# Patient Record
Sex: FEMALE | URBAN - METROPOLITAN AREA
[De-identification: names, ages, dates, MRNs, and addresses within clinical notes are randomized per-mention and may not be internally consistent; named-entity substitution may affect disease eponyms.]

---

## 2019-07-25 ENCOUNTER — TELEPHONE (OUTPATIENT)
Dept: INFECTIOUS DISEASES | Facility: CLINIC | Age: 39
End: 2019-07-25

## 2019-07-25 NOTE — TELEPHONE ENCOUNTER
Pt called to establish care with our practice for HIV  She currently sees a provider in [place] but it is too far for her to travel  Pt will have her records and labs forwarded to our office  We can then order labs and schedule pt for new pt appt  Gave pt fax # to have records sent

## 2022-10-28 ENCOUNTER — APPOINTMENT (OUTPATIENT)
Dept: RADIOLOGY | Facility: MEDICAL CENTER | Age: 42
End: 2022-10-28
Payer: COMMERCIAL

## 2022-10-28 ENCOUNTER — OFFICE VISIT (OUTPATIENT)
Dept: URGENT CARE | Facility: MEDICAL CENTER | Age: 42
End: 2022-10-28
Payer: COMMERCIAL

## 2022-10-28 VITALS
RESPIRATION RATE: 18 BRPM | HEIGHT: 66 IN | DIASTOLIC BLOOD PRESSURE: 90 MMHG | WEIGHT: 199 LBS | SYSTOLIC BLOOD PRESSURE: 150 MMHG | OXYGEN SATURATION: 100 % | HEART RATE: 83 BPM | BODY MASS INDEX: 31.98 KG/M2 | TEMPERATURE: 98.5 F

## 2022-10-28 DIAGNOSIS — R07.81 RIB PAIN: ICD-10-CM

## 2022-10-28 DIAGNOSIS — F41.0 PANIC ATTACK: Primary | ICD-10-CM

## 2022-10-28 PROCEDURE — 99203 OFFICE O/P NEW LOW 30 MIN: CPT | Performed by: PHYSICIAN ASSISTANT

## 2022-10-28 PROCEDURE — 71101 X-RAY EXAM UNILAT RIBS/CHEST: CPT

## 2022-10-28 RX ORDER — HYDROXYZINE PAMOATE 100 MG/1
100 CAPSULE ORAL 3 TIMES DAILY PRN
Qty: 30 CAPSULE | Refills: 0 | Status: SHIPPED | OUTPATIENT
Start: 2022-10-28

## 2022-10-28 RX ORDER — BICTEGRAVIR SODIUM, EMTRICITABINE, AND TENOFOVIR ALAFENAMIDE FUMARATE 50; 200; 25 MG/1; MG/1; MG/1
1 TABLET ORAL DAILY
COMMUNITY
Start: 2022-10-04

## 2022-10-28 RX ORDER — NAPROXEN 500 MG/1
500 TABLET ORAL 2 TIMES DAILY PRN
COMMUNITY
Start: 2022-07-21
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
## 2022-10-28 NOTE — LETTER
October 28, 2022     Patient: Delliah Gallardo   YOB: 1980   Date of Visit: 10/28/2022       To Whom it May Concern:    Delilah Gallardo was seen in my clinic on 10/28/2022  She is excused from work 10/28/2022    If you have any questions or concerns, please don't hesitate to call           Sincerely,          Brittaney Adan PA-C        CC: No Recipients

## 2023-08-17 ENCOUNTER — OCCMED (OUTPATIENT)
Dept: URGENT CARE | Facility: CLINIC | Age: 43
End: 2023-08-17
Payer: OTHER MISCELLANEOUS

## 2023-08-17 ENCOUNTER — APPOINTMENT (OUTPATIENT)
Dept: RADIOLOGY | Facility: CLINIC | Age: 43
End: 2023-08-17
Payer: OTHER MISCELLANEOUS

## 2023-08-17 DIAGNOSIS — M25.562 ACUTE PAIN OF LEFT KNEE: ICD-10-CM

## 2023-08-17 DIAGNOSIS — M79.642 LEFT HAND PAIN: ICD-10-CM

## 2023-08-17 DIAGNOSIS — M54.50 LUMBAR BACK PAIN: ICD-10-CM

## 2023-08-17 DIAGNOSIS — S60.212A CONTUSION OF LEFT WRIST, INITIAL ENCOUNTER: ICD-10-CM

## 2023-08-17 DIAGNOSIS — S39.012A STRAIN OF LUMBAR REGION, INITIAL ENCOUNTER: ICD-10-CM

## 2023-08-17 DIAGNOSIS — S80.02XA CONTUSION OF LEFT KNEE, INITIAL ENCOUNTER: ICD-10-CM
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
## 2023-08-17 DIAGNOSIS — S60.222A CONTUSION OF LEFT HAND, INITIAL ENCOUNTER: Primary | ICD-10-CM

## 2023-08-17 DIAGNOSIS — M25.532 LEFT WRIST PAIN: ICD-10-CM

## 2023-08-17 PROCEDURE — 73564 X-RAY EXAM KNEE 4 OR MORE: CPT

## 2023-08-17 PROCEDURE — G0382 LEV 3 HOSP TYPE B ED VISIT: HCPCS

## 2023-08-17 PROCEDURE — 99283 EMERGENCY DEPT VISIT LOW MDM: CPT

## 2023-08-17 PROCEDURE — 73130 X-RAY EXAM OF HAND: CPT

## 2023-08-17 PROCEDURE — 72100 X-RAY EXAM L-S SPINE 2/3 VWS: CPT

## 2023-08-17 PROCEDURE — 73110 X-RAY EXAM OF WRIST: CPT

## 2023-08-22 ENCOUNTER — OFFICE VISIT (OUTPATIENT)
Dept: URGENT CARE | Facility: CLINIC | Age: 43
End: 2023-08-22
Payer: OTHER MISCELLANEOUS

## 2023-08-22 DIAGNOSIS — S60.212D CONTUSION OF LEFT WRIST, SUBSEQUENT ENCOUNTER: ICD-10-CM

## 2023-08-22 DIAGNOSIS — S39.012D STRAIN OF MUSCLE, FASCIA AND TENDON OF LOWER BACK, SUBSEQUENT ENCOUNTER: ICD-10-CM

## 2023-08-22 DIAGNOSIS — S60.222D CONTUSION OF LEFT HAND, SUBSEQUENT ENCOUNTER: Primary | ICD-10-CM

## 2023-08-22 DIAGNOSIS — S80.02XD CONTUSION OF LEFT KNEE, SUBSEQUENT ENCOUNTER: ICD-10-CM
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
## 2023-08-22 PROCEDURE — 99213 OFFICE O/P EST LOW 20 MIN: CPT

## 2023-08-22 NOTE — PROGRESS NOTES
Acknowledgement of Current Treatment Plan     I have reviewed my treatment plan with my therapist / counselor on 6/21. I agree with the plan as it is written in the electronic health record, and I have had input into the goals and strategies.       Client Name:   Loan Calhoun   Signature:  _______________________________  Date:  ________ Time: __________     Name of Therapist or Counselor:  Terence LESTER                Date: June 21, 2023   Time: 7:32 AM          This encounter was created for [facility] orders only .

## 2023-08-30 ENCOUNTER — OCCMED (OUTPATIENT)
Dept: URGENT CARE | Facility: CLINIC | Age: 43
End: 2023-08-30

## 2023-08-30 DIAGNOSIS — S80.02XD CONTUSION OF LEFT KNEE, SUBSEQUENT ENCOUNTER: ICD-10-CM

## 2023-08-30 DIAGNOSIS — S39.012D STRAIN OF MUSCLE, FASCIA AND TENDON OF LOWER BACK, SUBSEQUENT ENCOUNTER: ICD-10-CM

## 2023-08-30 DIAGNOSIS — S60.222D CONTUSION OF LEFT HAND, SUBSEQUENT ENCOUNTER: Primary | ICD-10-CM

## 2023-08-30 DIAGNOSIS — S60.212D CONTUSION OF LEFT WRIST, SUBSEQUENT ENCOUNTER: ICD-10-CM
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
## 2023-09-14 ENCOUNTER — OCCMED (OUTPATIENT)
Dept: URGENT CARE | Facility: CLINIC | Age: 43
End: 2023-09-14
Payer: OTHER MISCELLANEOUS

## 2023-09-14 DIAGNOSIS — S80.02XD CONTUSION OF LEFT KNEE, SUBSEQUENT ENCOUNTER: ICD-10-CM

## 2023-09-14 DIAGNOSIS — S60.212D CONTUSION OF LEFT WRIST, SUBSEQUENT ENCOUNTER: ICD-10-CM

## 2023-09-14 DIAGNOSIS — S60.222D CONTUSION OF LEFT HAND, SUBSEQUENT ENCOUNTER: Primary | ICD-10-CM
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
## 2023-09-14 DIAGNOSIS — S39.012D STRAIN OF MUSCLE, FASCIA AND TENDON OF LOWER BACK, SUBSEQUENT ENCOUNTER: ICD-10-CM

## 2023-09-14 PROCEDURE — 99213 OFFICE O/P EST LOW 20 MIN: CPT

## 2023-11-10 ENCOUNTER — OCCMED (OUTPATIENT)
Dept: URGENT CARE | Facility: CLINIC | Age: 43
End: 2023-11-10
Payer: OTHER MISCELLANEOUS

## 2023-11-10 ENCOUNTER — APPOINTMENT (OUTPATIENT)
Dept: RADIOLOGY | Facility: CLINIC | Age: 43
End: 2023-11-10
Payer: OTHER MISCELLANEOUS
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
## 2023-11-10 DIAGNOSIS — M79.661 PAIN IN RIGHT LOWER LEG: ICD-10-CM

## 2023-11-10 DIAGNOSIS — M54.41 ACUTE MIDLINE LOW BACK PAIN WITH RIGHT-SIDED SCIATICA: ICD-10-CM

## 2023-11-10 DIAGNOSIS — S00.03XA CONTUSION OF SCALP, INITIAL ENCOUNTER: ICD-10-CM

## 2023-11-10 PROCEDURE — 72100 X-RAY EXAM L-S SPINE 2/3 VWS: CPT

## 2023-11-10 PROCEDURE — 99284 EMERGENCY DEPT VISIT MOD MDM: CPT

## 2023-11-10 PROCEDURE — 73000 X-RAY EXAM OF COLLAR BONE: CPT

## 2023-11-10 PROCEDURE — 73590 X-RAY EXAM OF LOWER LEG: CPT

## 2023-11-10 PROCEDURE — G0383 LEV 4 HOSP TYPE B ED VISIT: HCPCS

## 2023-11-16 ENCOUNTER — APPOINTMENT (OUTPATIENT)
Dept: URGENT CARE | Facility: CLINIC | Age: 43
End: 2023-11-16
Payer: OTHER MISCELLANEOUS

## 2023-11-16 PROCEDURE — 99213 OFFICE O/P EST LOW 20 MIN: CPT

## 2023-11-30 ENCOUNTER — APPOINTMENT (OUTPATIENT)
Dept: URGENT CARE | Facility: CLINIC | Age: 43
End: 2023-11-30
Payer: OTHER MISCELLANEOUS

## 2023-11-30 PROCEDURE — 99213 OFFICE O/P EST LOW 20 MIN: CPT

## 2023-12-12 ENCOUNTER — APPOINTMENT (OUTPATIENT)
Dept: URGENT CARE | Facility: CLINIC | Age: 43
End: 2023-12-12
Payer: OTHER MISCELLANEOUS

## 2023-12-12 PROCEDURE — 99213 OFFICE O/P EST LOW 20 MIN: CPT

## 2023-12-27 ENCOUNTER — APPOINTMENT (OUTPATIENT)
Dept: URGENT CARE | Facility: MEDICAL CENTER | Age: 43
End: 2023-12-27
Payer: OTHER MISCELLANEOUS

## 2023-12-27 PROCEDURE — 99214 OFFICE O/P EST MOD 30 MIN: CPT

## 2024-01-04 ENCOUNTER — HOSPITAL ENCOUNTER (OUTPATIENT)
Dept: MRI IMAGING | Facility: HOSPITAL | Age: 44
Discharge: HOME | End: 2024-01-04
Attending: FAMILY MEDICINE
Payer: OTHER MISCELLANEOUS

## 2024-01-04 DIAGNOSIS — M54.2 CERVICALGIA: ICD-10-CM

## 2024-01-04 DIAGNOSIS — M54.16 LUMBAR RADICULOPATHY: ICD-10-CM

## 2024-01-04 PROCEDURE — 72141 MRI NECK SPINE W/O DYE: CPT

## 2024-01-04 PROCEDURE — 72148 MRI LUMBAR SPINE W/O DYE: CPT

## 2024-01-04 PROCEDURE — G1004 CDSM NDSC: HCPCS

## 2024-01-10 ENCOUNTER — APPOINTMENT (OUTPATIENT)
Dept: URGENT CARE | Facility: CLINIC | Age: 44
End: 2024-01-10
Payer: OTHER MISCELLANEOUS

## 2024-01-10 PROCEDURE — 99213 OFFICE O/P EST LOW 20 MIN: CPT

## 2024-01-16 ENCOUNTER — EVALUATION (OUTPATIENT)
Dept: PHYSICAL THERAPY | Facility: CLINIC | Age: 44
End: 2024-01-16
Payer: OTHER MISCELLANEOUS

## 2024-01-16 DIAGNOSIS — S40.012D CONTUSION OF LEFT SCAPULA, SUBSEQUENT ENCOUNTER: Primary | ICD-10-CM

## 2024-01-16 PROCEDURE — 97140 MANUAL THERAPY 1/> REGIONS: CPT | Performed by: PHYSICAL THERAPIST

## 2024-01-16 PROCEDURE — 97161 PT EVAL LOW COMPLEX 20 MIN: CPT | Performed by: PHYSICAL THERAPIST

## 2024-01-16 PROCEDURE — 97110 THERAPEUTIC EXERCISES: CPT | Performed by: PHYSICAL THERAPIST
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
## 2024-01-18 ENCOUNTER — OFFICE VISIT (OUTPATIENT)
Dept: PHYSICAL THERAPY | Facility: CLINIC | Age: 44
End: 2024-01-18
Payer: OTHER MISCELLANEOUS

## 2024-01-18 DIAGNOSIS — S40.012D CONTUSION OF LEFT SCAPULA, SUBSEQUENT ENCOUNTER: Primary | ICD-10-CM

## 2024-01-18 PROCEDURE — 97140 MANUAL THERAPY 1/> REGIONS: CPT

## 2024-01-18 PROCEDURE — 97110 THERAPEUTIC EXERCISES: CPT
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
## 2024-01-23 ENCOUNTER — OFFICE VISIT (OUTPATIENT)
Dept: PHYSICAL THERAPY | Facility: CLINIC | Age: 44
End: 2024-01-23
Payer: OTHER MISCELLANEOUS

## 2024-01-23 DIAGNOSIS — S40.012D CONTUSION OF LEFT SCAPULA, SUBSEQUENT ENCOUNTER: Primary | ICD-10-CM

## 2024-01-23 PROCEDURE — 97110 THERAPEUTIC EXERCISES: CPT

## 2024-01-23 PROCEDURE — 97140 MANUAL THERAPY 1/> REGIONS: CPT
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
## 2024-01-25 ENCOUNTER — OFFICE VISIT (OUTPATIENT)
Dept: PHYSICAL THERAPY | Facility: CLINIC | Age: 44
End: 2024-01-25
Payer: OTHER MISCELLANEOUS

## 2024-01-25 DIAGNOSIS — S40.012D CONTUSION OF LEFT SCAPULA, SUBSEQUENT ENCOUNTER: Primary | ICD-10-CM

## 2024-01-25 PROCEDURE — 97140 MANUAL THERAPY 1/> REGIONS: CPT

## 2024-01-25 PROCEDURE — 97110 THERAPEUTIC EXERCISES: CPT

## 2024-01-25 PROCEDURE — 97112 NEUROMUSCULAR REEDUCATION: CPT
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
## 2024-01-30 ENCOUNTER — OFFICE VISIT (OUTPATIENT)
Dept: PHYSICAL THERAPY | Facility: CLINIC | Age: 44
End: 2024-01-30
Payer: OTHER MISCELLANEOUS

## 2024-01-30 DIAGNOSIS — S40.012D CONTUSION OF LEFT SCAPULA, SUBSEQUENT ENCOUNTER: Primary | ICD-10-CM

## 2024-01-30 PROCEDURE — 97110 THERAPEUTIC EXERCISES: CPT | Performed by: PHYSICAL THERAPIST

## 2024-01-30 PROCEDURE — 97140 MANUAL THERAPY 1/> REGIONS: CPT | Performed by: PHYSICAL THERAPIST

## 2024-01-30 PROCEDURE — 97112 NEUROMUSCULAR REEDUCATION: CPT | Performed by: PHYSICAL THERAPIST
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
## 2024-01-31 ENCOUNTER — APPOINTMENT (OUTPATIENT)
Dept: URGENT CARE | Facility: MEDICAL CENTER | Age: 44
End: 2024-01-31
Payer: OTHER MISCELLANEOUS

## 2024-01-31 PROCEDURE — 99213 OFFICE O/P EST LOW 20 MIN: CPT | Performed by: FAMILY MEDICINE

## 2024-02-01 ENCOUNTER — OFFICE VISIT (OUTPATIENT)
Dept: PHYSICAL THERAPY | Facility: CLINIC | Age: 44
End: 2024-02-01
Payer: OTHER MISCELLANEOUS

## 2024-02-01 DIAGNOSIS — S40.012D CONTUSION OF LEFT SCAPULA, SUBSEQUENT ENCOUNTER: Primary | ICD-10-CM

## 2024-02-01 PROCEDURE — 97112 NEUROMUSCULAR REEDUCATION: CPT

## 2024-02-01 PROCEDURE — 97110 THERAPEUTIC EXERCISES: CPT

## 2024-02-01 PROCEDURE — 97140 MANUAL THERAPY 1/> REGIONS: CPT
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
## 2024-02-06 ENCOUNTER — OFFICE VISIT (OUTPATIENT)
Dept: PHYSICAL THERAPY | Facility: CLINIC | Age: 44
End: 2024-02-06
Payer: OTHER MISCELLANEOUS

## 2024-02-06 DIAGNOSIS — S40.012D CONTUSION OF LEFT SCAPULA, SUBSEQUENT ENCOUNTER: Primary | ICD-10-CM

## 2024-02-06 PROCEDURE — 97110 THERAPEUTIC EXERCISES: CPT
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
## 2024-02-08 ENCOUNTER — EVALUATION (OUTPATIENT)
Dept: PHYSICAL THERAPY | Facility: CLINIC | Age: 44
End: 2024-02-08
Payer: OTHER MISCELLANEOUS

## 2024-02-08 DIAGNOSIS — S40.012D CONTUSION OF LEFT SCAPULA, SUBSEQUENT ENCOUNTER: Primary | ICD-10-CM

## 2024-02-08 PROCEDURE — 97140 MANUAL THERAPY 1/> REGIONS: CPT

## 2024-02-08 PROCEDURE — 97110 THERAPEUTIC EXERCISES: CPT

## 2024-02-08 PROCEDURE — 97112 NEUROMUSCULAR REEDUCATION: CPT
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
## 2024-02-13 ENCOUNTER — APPOINTMENT (OUTPATIENT)
Dept: PHYSICAL THERAPY | Facility: CLINIC | Age: 44
End: 2024-02-13
Payer: OTHER MISCELLANEOUS

## 2024-02-14 ENCOUNTER — OFFICE VISIT (OUTPATIENT)
Dept: PHYSICAL THERAPY | Facility: CLINIC | Age: 44
End: 2024-02-14
Payer: OTHER MISCELLANEOUS

## 2024-02-14 DIAGNOSIS — S40.012D CONTUSION OF LEFT SCAPULA, SUBSEQUENT ENCOUNTER: Primary | ICD-10-CM

## 2024-02-14 PROCEDURE — 97112 NEUROMUSCULAR REEDUCATION: CPT

## 2024-02-14 PROCEDURE — 97110 THERAPEUTIC EXERCISES: CPT
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
## 2024-02-15 ENCOUNTER — OFFICE VISIT (OUTPATIENT)
Dept: PHYSICAL THERAPY | Facility: CLINIC | Age: 44
End: 2024-02-15
Payer: OTHER MISCELLANEOUS

## 2024-02-15 DIAGNOSIS — S40.012D CONTUSION OF LEFT SCAPULA, SUBSEQUENT ENCOUNTER: Primary | ICD-10-CM

## 2024-02-15 PROCEDURE — 97112 NEUROMUSCULAR REEDUCATION: CPT | Performed by: PHYSICAL THERAPIST

## 2024-02-15 PROCEDURE — 97110 THERAPEUTIC EXERCISES: CPT | Performed by: PHYSICAL THERAPIST

## 2024-02-15 PROCEDURE — 97140 MANUAL THERAPY 1/> REGIONS: CPT | Performed by: PHYSICAL THERAPIST
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
## 2024-02-20 ENCOUNTER — OFFICE VISIT (OUTPATIENT)
Dept: PHYSICAL THERAPY | Facility: CLINIC | Age: 44
End: 2024-02-20
Payer: OTHER MISCELLANEOUS

## 2024-02-20 DIAGNOSIS — S40.012D CONTUSION OF LEFT SCAPULA, SUBSEQUENT ENCOUNTER: Primary | ICD-10-CM

## 2024-02-20 PROCEDURE — 97112 NEUROMUSCULAR REEDUCATION: CPT

## 2024-02-20 PROCEDURE — 97110 THERAPEUTIC EXERCISES: CPT

## 2024-02-20 PROCEDURE — 97140 MANUAL THERAPY 1/> REGIONS: CPT
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
## 2024-02-22 ENCOUNTER — OFFICE VISIT (OUTPATIENT)
Dept: PHYSICAL THERAPY | Facility: CLINIC | Age: 44
End: 2024-02-22
Payer: OTHER MISCELLANEOUS

## 2024-02-22 DIAGNOSIS — S40.012D CONTUSION OF LEFT SCAPULA, SUBSEQUENT ENCOUNTER: Primary | ICD-10-CM

## 2024-02-22 PROCEDURE — 97110 THERAPEUTIC EXERCISES: CPT

## 2024-02-22 PROCEDURE — 97112 NEUROMUSCULAR REEDUCATION: CPT

## 2024-02-22 PROCEDURE — 97530 THERAPEUTIC ACTIVITIES: CPT
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
## 2024-02-26 ENCOUNTER — OFFICE VISIT (OUTPATIENT)
Dept: PHYSICAL THERAPY | Facility: CLINIC | Age: 44
End: 2024-02-26
Payer: OTHER MISCELLANEOUS

## 2024-02-26 DIAGNOSIS — S40.012D CONTUSION OF LEFT SCAPULA, SUBSEQUENT ENCOUNTER: Primary | ICD-10-CM

## 2024-02-26 PROCEDURE — 97112 NEUROMUSCULAR REEDUCATION: CPT

## 2024-02-26 PROCEDURE — 97110 THERAPEUTIC EXERCISES: CPT
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
## 2024-02-28 ENCOUNTER — APPOINTMENT (OUTPATIENT)
Dept: URGENT CARE | Facility: MEDICAL CENTER | Age: 44
End: 2024-02-28
Payer: OTHER MISCELLANEOUS

## 2024-02-28 PROCEDURE — 99214 OFFICE O/P EST MOD 30 MIN: CPT | Performed by: FAMILY MEDICINE

## 2024-02-29 ENCOUNTER — OFFICE VISIT (OUTPATIENT)
Dept: PHYSICAL THERAPY | Facility: CLINIC | Age: 44
End: 2024-02-29
Payer: OTHER MISCELLANEOUS

## 2024-02-29 DIAGNOSIS — S40.012D CONTUSION OF LEFT SCAPULA, SUBSEQUENT ENCOUNTER: Primary | ICD-10-CM

## 2024-02-29 PROCEDURE — 97140 MANUAL THERAPY 1/> REGIONS: CPT

## 2024-02-29 PROCEDURE — 97530 THERAPEUTIC ACTIVITIES: CPT

## 2024-02-29 PROCEDURE — 97112 NEUROMUSCULAR REEDUCATION: CPT

## 2024-02-29 PROCEDURE — 97110 THERAPEUTIC EXERCISES: CPT
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
## 2024-03-05 ENCOUNTER — OFFICE VISIT (OUTPATIENT)
Dept: PHYSICAL THERAPY | Facility: CLINIC | Age: 44
End: 2024-03-05
Payer: OTHER MISCELLANEOUS

## 2024-03-05 DIAGNOSIS — S40.012D CONTUSION OF LEFT SCAPULA, SUBSEQUENT ENCOUNTER: Primary | ICD-10-CM

## 2024-03-05 PROCEDURE — 97110 THERAPEUTIC EXERCISES: CPT
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
## 2024-03-07 ENCOUNTER — OFFICE VISIT (OUTPATIENT)
Dept: PHYSICAL THERAPY | Facility: CLINIC | Age: 44
End: 2024-03-07
Payer: OTHER MISCELLANEOUS

## 2024-03-07 DIAGNOSIS — S40.012D CONTUSION OF LEFT SCAPULA, SUBSEQUENT ENCOUNTER: Primary | ICD-10-CM

## 2024-03-07 PROCEDURE — 97110 THERAPEUTIC EXERCISES: CPT

## 2024-03-07 PROCEDURE — 97530 THERAPEUTIC ACTIVITIES: CPT

## 2024-03-07 PROCEDURE — 97112 NEUROMUSCULAR REEDUCATION: CPT

## 2024-03-07 PROCEDURE — 97116 GAIT TRAINING THERAPY: CPT
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
## 2024-03-12 ENCOUNTER — EVALUATION (OUTPATIENT)
Dept: PHYSICAL THERAPY | Facility: CLINIC | Age: 44
End: 2024-03-12
Payer: OTHER MISCELLANEOUS

## 2024-03-12 DIAGNOSIS — S40.012D CONTUSION OF LEFT SCAPULA, SUBSEQUENT ENCOUNTER: Primary | ICD-10-CM

## 2024-03-12 PROCEDURE — 97110 THERAPEUTIC EXERCISES: CPT | Performed by: PHYSICAL THERAPIST

## 2024-03-12 PROCEDURE — 97112 NEUROMUSCULAR REEDUCATION: CPT | Performed by: PHYSICAL THERAPIST

## 2024-03-12 PROCEDURE — 97530 THERAPEUTIC ACTIVITIES: CPT | Performed by: PHYSICAL THERAPIST
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
## 2024-03-14 ENCOUNTER — OFFICE VISIT (OUTPATIENT)
Dept: PHYSICAL THERAPY | Facility: CLINIC | Age: 44
End: 2024-03-14
Payer: OTHER MISCELLANEOUS

## 2024-03-14 DIAGNOSIS — S40.012D CONTUSION OF LEFT SCAPULA, SUBSEQUENT ENCOUNTER: Primary | ICD-10-CM

## 2024-03-14 PROCEDURE — 97530 THERAPEUTIC ACTIVITIES: CPT

## 2024-03-14 PROCEDURE — 97110 THERAPEUTIC EXERCISES: CPT
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
## 2024-03-19 ENCOUNTER — OFFICE VISIT (OUTPATIENT)
Dept: PHYSICAL THERAPY | Facility: CLINIC | Age: 44
End: 2024-03-19
Payer: OTHER MISCELLANEOUS

## 2024-03-19 DIAGNOSIS — S40.012D CONTUSION OF LEFT SCAPULA, SUBSEQUENT ENCOUNTER: Primary | ICD-10-CM

## 2024-03-19 PROCEDURE — 97530 THERAPEUTIC ACTIVITIES: CPT

## 2024-03-19 PROCEDURE — 97112 NEUROMUSCULAR REEDUCATION: CPT

## 2024-03-19 PROCEDURE — 97110 THERAPEUTIC EXERCISES: CPT
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
## 2024-03-21 ENCOUNTER — OFFICE VISIT (OUTPATIENT)
Dept: PHYSICAL THERAPY | Facility: CLINIC | Age: 44
End: 2024-03-21
Payer: OTHER MISCELLANEOUS

## 2024-03-21 DIAGNOSIS — S40.012D CONTUSION OF LEFT SCAPULA, SUBSEQUENT ENCOUNTER: Primary | ICD-10-CM

## 2024-03-21 PROCEDURE — 97112 NEUROMUSCULAR REEDUCATION: CPT

## 2024-03-21 PROCEDURE — 97110 THERAPEUTIC EXERCISES: CPT
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
## 2024-03-26 ENCOUNTER — OFFICE VISIT (OUTPATIENT)
Dept: PHYSICAL THERAPY | Facility: CLINIC | Age: 44
End: 2024-03-26
Payer: OTHER MISCELLANEOUS

## 2024-03-26 DIAGNOSIS — S40.012D CONTUSION OF LEFT SCAPULA, SUBSEQUENT ENCOUNTER: Primary | ICD-10-CM

## 2024-03-26 PROCEDURE — 97110 THERAPEUTIC EXERCISES: CPT

## 2024-03-26 PROCEDURE — 97112 NEUROMUSCULAR REEDUCATION: CPT
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
## 2024-03-27 ENCOUNTER — APPOINTMENT (OUTPATIENT)
Dept: URGENT CARE | Facility: MEDICAL CENTER | Age: 44
End: 2024-03-27
Payer: OTHER MISCELLANEOUS

## 2024-03-27 ENCOUNTER — OFFICE VISIT (OUTPATIENT)
Dept: PHYSICAL THERAPY | Facility: CLINIC | Age: 44
End: 2024-03-27
Payer: OTHER MISCELLANEOUS

## 2024-03-27 DIAGNOSIS — S40.012D CONTUSION OF LEFT SCAPULA, SUBSEQUENT ENCOUNTER: Primary | ICD-10-CM

## 2024-03-27 PROCEDURE — 99213 OFFICE O/P EST LOW 20 MIN: CPT

## 2024-03-27 PROCEDURE — 97530 THERAPEUTIC ACTIVITIES: CPT

## 2024-03-27 PROCEDURE — 97110 THERAPEUTIC EXERCISES: CPT

## 2024-03-27 PROCEDURE — 97112 NEUROMUSCULAR REEDUCATION: CPT
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
## 2024-03-28 ENCOUNTER — APPOINTMENT (OUTPATIENT)
Dept: PHYSICAL THERAPY | Facility: CLINIC | Age: 44
End: 2024-03-28
Payer: OTHER MISCELLANEOUS

## 2024-04-01 ENCOUNTER — EVALUATION (OUTPATIENT)
Dept: PHYSICAL THERAPY | Facility: CLINIC | Age: 44
End: 2024-04-01
Payer: OTHER MISCELLANEOUS

## 2024-04-01 DIAGNOSIS — S40.012D CONTUSION OF LEFT SCAPULA, SUBSEQUENT ENCOUNTER: Primary | ICD-10-CM

## 2024-04-01 PROCEDURE — 97530 THERAPEUTIC ACTIVITIES: CPT | Performed by: PHYSICAL THERAPIST

## 2024-04-01 PROCEDURE — 97110 THERAPEUTIC EXERCISES: CPT | Performed by: PHYSICAL THERAPIST

## 2024-04-01 PROCEDURE — 97112 NEUROMUSCULAR REEDUCATION: CPT | Performed by: PHYSICAL THERAPIST
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
## 2024-04-03 ENCOUNTER — OFFICE VISIT (OUTPATIENT)
Dept: PHYSICAL THERAPY | Facility: CLINIC | Age: 44
End: 2024-04-03
Payer: OTHER MISCELLANEOUS

## 2024-04-03 DIAGNOSIS — S40.012D CONTUSION OF LEFT SCAPULA, SUBSEQUENT ENCOUNTER: Primary | ICD-10-CM

## 2024-04-03 PROCEDURE — 97112 NEUROMUSCULAR REEDUCATION: CPT

## 2024-04-03 PROCEDURE — 97110 THERAPEUTIC EXERCISES: CPT
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
## 2024-04-08 ENCOUNTER — APPOINTMENT (OUTPATIENT)
Dept: RADIOLOGY | Facility: CLINIC | Age: 44
End: 2024-04-08
Payer: COMMERCIAL

## 2024-04-08 ENCOUNTER — OFFICE VISIT (OUTPATIENT)
Dept: URGENT CARE | Facility: CLINIC | Age: 44
End: 2024-04-08
Payer: COMMERCIAL

## 2024-04-08 VITALS
RESPIRATION RATE: 20 BRPM | HEART RATE: 94 BPM | TEMPERATURE: 98.7 F | DIASTOLIC BLOOD PRESSURE: 84 MMHG | OXYGEN SATURATION: 96 % | SYSTOLIC BLOOD PRESSURE: 150 MMHG

## 2024-04-08 DIAGNOSIS — R06.2 WHEEZING: ICD-10-CM

## 2024-04-08 DIAGNOSIS — R06.2 WHEEZING: Primary | ICD-10-CM

## 2024-04-08 DIAGNOSIS — J01.11 ACUTE RECURRENT FRONTAL SINUSITIS: ICD-10-CM

## 2024-04-08 PROCEDURE — 94640 AIRWAY INHALATION TREATMENT: CPT | Performed by: PHYSICIAN ASSISTANT

## 2024-04-08 PROCEDURE — G0382 LEV 3 HOSP TYPE B ED VISIT: HCPCS | Performed by: PHYSICIAN ASSISTANT

## 2024-04-08 PROCEDURE — 71046 X-RAY EXAM CHEST 2 VIEWS: CPT

## 2024-04-08 RX ORDER — IPRATROPIUM BROMIDE AND ALBUTEROL SULFATE 2.5; .5 MG/3ML; MG/3ML
3 SOLUTION RESPIRATORY (INHALATION) ONCE
Status: COMPLETED | OUTPATIENT
Start: 2024-04-08 | End: 2024-04-08

## 2024-04-08 RX ORDER — PREDNISONE 10 MG/1
TABLET ORAL
Qty: 20 TABLET | Refills: 0 | Status: SHIPPED | OUTPATIENT
Start: 2024-04-08

## 2024-04-08 RX ORDER — DOXYCYCLINE HYCLATE 100 MG
100 TABLET ORAL 2 TIMES DAILY
Qty: 14 TABLET | Refills: 0 | Status: SHIPPED | OUTPATIENT
Start: 2024-04-08 | End: 2024-04-15

## 2024-04-08 RX ORDER — IPRATROPIUM BROMIDE AND ALBUTEROL SULFATE 2.5; .5 MG/3ML; MG/3ML
3 SOLUTION RESPIRATORY (INHALATION)
Status: DISCONTINUED | OUTPATIENT
Start: 2024-04-08 | End: 2024-04-08

## 2024-04-08 RX ORDER — ALBUTEROL SULFATE 90 UG/1
2 AEROSOL, METERED RESPIRATORY (INHALATION) EVERY 6 HOURS PRN
Qty: 18 G | Refills: 0 | Status: SHIPPED | OUTPATIENT
Start: 2024-04-08

## 2024-04-08 RX ADMIN — IPRATROPIUM BROMIDE AND ALBUTEROL SULFATE 3 ML: 2.5; .5 SOLUTION RESPIRATORY (INHALATION) at 18:40

## 2024-04-08 NOTE — PROGRESS NOTES
[facility]        NAME: Roxana Jernigan is a 44 y.o. female  : 1980    MRN: [id number]  DATE: 2024  TIME: 7:16 PM    Assessment and Plan   Wheezing [R06.2]  1. Wheezing  XR chest pa & lateral    ipratropium-albuterol (DUO-NEB) 0.5-2.5 mg/3 mL inhalation solution 3 mL    predniSONE 10 mg tablet    albuterol (Ventolin HFA) 90 mcg/act inhaler    DISCONTINUED: ipratropium-albuterol (DUO-NEB) 0.5-2.5 mg/3 mL inhalation solution 3 mL      2. Acute recurrent frontal sinusitis  doxycycline hyclate (VIBRA-TABS) 100 mg tablet            Patient Instructions     Patient Instructions   Sinusitis   WHAT YOU NEED TO KNOW:   Sinusitis is inflammation or infection of your sinuses. Sinusitis is most often caused by a virus. Acute sinusitis may last up to 12 weeks. Chronic sinusitis lasts longer than 12 weeks. Recurrent sinusitis means you have 4 or more infections in 1 year.        DISCHARGE INSTRUCTIONS:   Return to the emergency department if:   You have trouble breathing or wheezing that is getting worse.    You have a stiff neck, a fever, or a bad headache.     You cannot open your eye.     Your eyeball bulges out or you cannot move your eye.     You are more sleepy than normal, or you notice changes in your ability to think, move, or talk.    You have swelling of your forehead or scalp.    Call your doctor if:   You have vision changes, such as double vision.    Your eye and eyelid are red, swollen, and painful.     Your symptoms do not improve or go away after 10 days.    You have nausea and are vomiting.    Your nose is bleeding.    You have questions or concerns about your condition or care.    Medicines:  Your symptoms may go away on their own. Your healthcare provider may recommend watchful waiting for up to 10 days before starting antibiotics. You may need any of the following:  Acetaminophen  decreases pain and fever. It is available without a doctor's order. Ask how much to take and how often  to take it. Follow directions. Read the labels of all other medicines you are using to see if they also contain acetaminophen, or ask your doctor or pharmacist. Acetaminophen can cause liver damage if not taken correctly.    NSAIDs , such as ibuprofen, help decrease swelling, pain, and fever. This medicine is available with or without a doctor's order. NSAIDs can cause stomach bleeding or kidney problems in certain people. If you take blood thinner medicine, always ask your healthcare provider if NSAIDs are safe for you. Always read the medicine label and follow directions.    Nasal steroid sprays  may help decrease inflammation in your nose and sinuses.    Decongestants  help reduce swelling and drain mucus in the nose and sinuses. They may help you breathe easier.     Antihistamines  help dry mucus in the nose and relieve sneezing.     Antibiotics  help treat or prevent a bacterial infection.    Take your medicine as directed.  Contact your healthcare provider if you think your medicine is not helping or if you have side effects. Tell your provider if you are allergic to any medicine. Keep a list of the medicines, vitamins, and herbs you take. Include the amounts, and when and why you take them. Bring the list or the pill bottles to follow-up visits. Carry your medicine list with you in case of an emergency.    Self-care:   Rinse your sinuses as directed.  Use a sinus rinse device to rinse your nasal passages with a saline (salt water) solution or distilled water. Do not use tap water. This will help thin the mucus in your nose and rinse away pollen and dirt. It will also help reduce swelling so you can breathe normally.    Use a humidifier  to increase air moisture in your home. This may make it easier for you to breathe and help decrease your cough.     Sleep with your head elevated.  Place an extra pillow under your head before you go to sleep to help your sinuses drain.     Drink liquids as directed.  Ask your  healthcare provider how much liquid to drink each day and which liquids are best for you. Liquids will thin the mucus in your nose and help it drain. Avoid drinks that contain alcohol or caffeine.     Do not smoke, and avoid secondhand smoke.  Nicotine and other chemicals in cigarettes and cigars can make your symptoms worse. Ask your healthcare provider for information if you currently smoke and need help to quit. E-cigarettes or smokeless tobacco still contain nicotine. Talk to your healthcare provider before you use these products.    Prevent the spread of germs:   Wash your hands often with soap and water.  Wash your hands after you use the bathroom, change a child's diaper, or sneeze. Wash your hands before you prepare or eat food.         Stay away from people who are sick.  Some germs spread easily and quickly through contact.    Follow up with your doctor as directed:  You may be referred to an ear, nose, and throat specialist. Write down your questions so you remember to ask them during your visits.   © Copyright [employer] 2023 Information is for End User's use only and may not be sold, redistributed or otherwise used for commercial purposes.  The above information is an  only. It is not intended as medical advice for individual conditions or treatments. Talk to your doctor, nurse or pharmacist before following any medical regimen to see if it is safe and effective for you.  Wheezing   WHAT YOU NEED TO KNOW:   Wheezing happens when air flows through a narrowed or blocked airway. Wheezing can happen when you breathe in, breathe out, or both. Wheezes may sound like a whistle, squeal, groan, or creak. Wheezes may also sound musical or high-pitched.  DISCHARGE INSTRUCTIONS:   Call your local emergency number (911 in the [place]) if:   You feel lightheaded, short of breath, and have chest pain.    You are dizzy, confused, or feel faint.    You have sudden trouble breathing.    Your throat feels like it  is swelling or feels tight.    Return to the emergency department if:   You cough up blood.      Call your doctor if:   You have a fever.    Your wheezing does not get better or it gets worse.    You have questions or concerns about your condition or care.    Medicines:   Medicines  may help open your airways, decrease your symptoms, or treat an infection. They may be given as an inhaler, nebulizer, or pill.    Take your medicine as directed.  Contact your healthcare provider if you think your medicine is not helping or if you have side effects. Tell your provider if you are allergic to any medicine. Keep a list of the medicines, vitamins, and herbs you take. Include the amounts, and when and why you take them. Bring the list or the pill bottles to follow-up visits. Carry your medicine list with you in case of an emergency.    Self care:   Return to your usual activity as directed.  You may need to limit certain activities. Ask your healthcare provider when it is okay to resume activity.    Take deep breaths and cough several times a day.  This will decrease your risk for a lung infection and help decrease wheezing. Take a deep breath and hold it for as long as you can. Let the air out and then cough strongly. Deep breaths help open your airway. You may be given an incentive spirometer to help you take deep breaths. Put the plastic piece in your mouth and take a slow, deep breath in, then let the air out and cough. Repeat these steps 10 times every hour.     Drink liquids as directed.  You may need to drink more liquids than usual to thin your mucus and prevent dehydration. Ask how much liquid to drink each day and which liquids are best for you.    Prevent wheezing:   Do not smoke.  Nicotine and other chemicals in cigarettes and cigars can cause lung damage. Ask your healthcare provider for information if you currently smoke and need help to quit. E-cigarettes or smokeless tobacco still contain nicotine. Talk to  your healthcare provider before you use these products.    Avoid allergy triggers , such as animals, grass, pollen, or dust.    Follow up with your doctor as directed:  You may be referred to a specialist. Write down your questions so you remember to ask them during your visits.   © Copyright [employer] 2023 Information is for End User's use only and may not be sold, redistributed or otherwise used for commercial purposes.  The above information is an  only. It is not intended as medical advice for individual conditions or treatments. Talk to your doctor, nurse or pharmacist before following any medical regimen to see if it is safe and effective for you.        Follow up with PCP in 3-5 days.  Proceed to  ER if symptoms worsen.    Chief Complaint     Chief Complaint   Patient presents with    Cold Like Symptoms     10 days ago symptoms started.  Called PCP and was given z fior x 7 days.  Today is last day.  Feels like she is getting worse and is short of breathe.  Needs to sleep on 3/4 pillows.  Nasal mucous is neon green.  Productive cough for yellow sputum.  Daughter is positive for strep throat         History of Present Illness       Patient is a 44-year-old female with past medical history significant for tobacco use and chronic sinus infections who presents to the clinic for sinus pressure, nasal congestion, cough, wheezing, and shortness of breath for the past 10 days.  She states that she contacted her primary care doctor who called in Zithromax for 7 days with 1 refill.  She states that she finished Zithromax today but still has a cough with green discharge.  She also complains of shortness of and wheezing.        Review of Systems   Review of Systems   Constitutional:  Positive for chills and fever.   HENT:  Positive for postnasal drip, rhinorrhea, sinus pressure and sinus pain. Negative for congestion, ear pain and sore throat.    Eyes:  Negative for pain and visual disturbance.   Respiratory:   Positive for cough, shortness of breath, wheezing and stridor.    Cardiovascular:  Negative for chest pain and palpitations.   Gastrointestinal:  Negative for abdominal pain and vomiting.   Genitourinary:  Negative for dysuria and hematuria.   Musculoskeletal:  Negative for arthralgias and back pain.   Skin:  Negative for color change and rash.   Neurological:  Positive for headaches. Negative for seizures and syncope.   All other systems reviewed and are negative.        Current Medications       Current Outpatient Medications:     albuterol (Ventolin HFA) 90 mcg/act inhaler, Inhale 2 puffs every 6 (six) hours as needed for wheezing, Disp: 18 g, Rfl: 0    Biktarvy -25 MG tablet, Take 1 tablet by mouth daily, Disp: , Rfl:     doxycycline hyclate (VIBRA-TABS) 100 mg tablet, Take 1 tablet (100 mg total) by mouth 2 (two) times a day for 7 days, Disp: 14 tablet, Rfl: 0    Multiple Vitamins-Minerals (Multi Complete) CAPS, Take 1 capsule by mouth daily, Disp: , Rfl:     predniSONE 10 mg tablet, 4 po for 2 days, then 3x2, 2x2, and 1x2, Disp: 20 tablet, Rfl: 0  No current facility-administered medications for this visit.    Current Allergies     Allergies as of 04/08/2024 - Reviewed 04/08/2024   Allergen Reaction Noted    Penicillins Hives 04/08/2024            The following portions of the patient's history were reviewed and updated as appropriate: allergies, current medications, past family history, past medical history, past social history, past surgical history and problem list.     History reviewed. No pertinent past medical history.    History reviewed. No pertinent surgical history.    History reviewed. No pertinent family history.      Medications have been verified.        Objective   /84 (BP Location: Right arm)   Pulse 94   Temp 98.7 °F (37.1 °C) (Skin)   Resp 20   LMP 03/18/2024 (Approximate)   SpO2 96%        Physical Exam     Physical Exam  Constitutional:       Appearance: She is  well-developed. She is not diaphoretic.   HENT:      Head: Normocephalic.      Right Ear: Tympanic membrane normal.      Left Ear: Tympanic membrane normal.      Nose: Congestion and rhinorrhea present.   Eyes:      General:         Right eye: No discharge.         Left eye: No discharge.      Pupils: Pupils are equal, round, and reactive to light.   Neck:      Thyroid: No thyromegaly.   Cardiovascular:      Rate and Rhythm: Normal rate.      Heart sounds: No murmur heard.  Pulmonary:      Effort: Pulmonary effort is normal. No respiratory distress.      Breath sounds: Wheezing and rhonchi present. No rales.   Chest:      Chest wall: No tenderness.   Abdominal:      General: There is no distension.      Palpations: Abdomen is soft.      Tenderness: There is no abdominal tenderness. There is no guarding or rebound.   Musculoskeletal:         General: Normal range of motion.      Cervical back: Normal range of motion.   Lymphadenopathy:      Cervical: No cervical adenopathy.   Skin:     General: Skin is warm.   Neurological:      Mental Status: She is alert and oriented to person, place, and time.       Mini neb    Performed by: Soto Trujillo PA-C  Authorized by: Soto Trujillo PA-C  Universal Protocol:  Consent: Verbal consent obtained.  Risks and benefits: risks, benefits and alternatives were discussed  Consent given by: patient  Patient identity confirmed: verbally with patient    Number of treatments:  1  Treatment 1:   Pre-Procedure     Symptoms:  Wheezing and cough    Lung Sounds:  Wheezing    HR:  94    RR:  20    SP02:  96    Medication Administered:  Duoneb - Albuterol 2.5 mg/Atrovent 0.5 mg  Post-Procedure     Symptoms:  Wheezing and cough    Lung sounds:  Improved-Slight wheezing    HR:  72    RR:  20    SP02:  95            -I personally interpreted the x-ray and reviewed the results with her.  There is no acute infiltrate  --She improved with nebulizer treatment.  -I will change her antibiotic to  doxycycline to treat sinusitis.  I will also add steroids.  She will follow-up with PCP to the ER if symptoms worsen I will provide prednisone for wheezing

## 2024-04-08 NOTE — LETTER
April 8, 2024     Patient: Roxana Jernigan   YOB: 1980   Date of Visit: 4/8/2024       To Whom it May Concern:    Roxana Jernigan was seen in my clinic on 4/8/2024.     If you have any questions or concerns, please don't hesitate to call.         Sincerely,          Soto Trujillo PA-C        CC: No Recipients

## 2024-04-08 NOTE — PATIENT INSTRUCTIONS
Sinusitis   WHAT YOU NEED TO KNOW:   Sinusitis is inflammation or infection of your sinuses. Sinusitis is most often caused by a virus. Acute sinusitis may last up to 12 weeks. Chronic sinusitis lasts longer than 12 weeks. Recurrent sinusitis means you have 4 or more infections in 1 year.        DISCHARGE INSTRUCTIONS:   Return to the emergency department if:   You have trouble breathing or wheezing that is getting worse.    You have a stiff neck, a fever, or a bad headache.     You cannot open your eye.     Your eyeball bulges out or you cannot move your eye.     You are more sleepy than normal, or you notice changes in your ability to think, move, or talk.    You have swelling of your forehead or scalp.    Call your doctor if:   You have vision changes, such as double vision.    Your eye and eyelid are red, swollen, and painful.     Your symptoms do not improve or go away after 10 days.    You have nausea and are vomiting.    Your nose is bleeding.    You have questions or concerns about your condition or care.    Medicines:  Your symptoms may go away on their own. Your healthcare provider may recommend watchful waiting for up to 10 days before starting antibiotics. You may need any of the following:  Acetaminophen  decreases pain and fever. It is available without a doctor's order. Ask how much to take and how often to take it. Follow directions. Read the labels of all other medicines you are using to see if they also contain acetaminophen, or ask your doctor or pharmacist. Acetaminophen can cause liver damage if not taken correctly.    NSAIDs , such as ibuprofen, help decrease swelling, pain, and fever. This medicine is available with or without a doctor's order. NSAIDs can cause stomach bleeding or kidney problems in certain people. If you take blood thinner medicine, always ask your healthcare provider if NSAIDs are safe for you. Always read the medicine label and follow directions.    Nasal steroid sprays   may help decrease inflammation in your nose and sinuses.    Decongestants  help reduce swelling and drain mucus in the nose and sinuses. They may help you breathe easier.     Antihistamines  help dry mucus in the nose and relieve sneezing.     Antibiotics  help treat or prevent a bacterial infection.    Take your medicine as directed.  Contact your healthcare provider if you think your medicine is not helping or if you have side effects. Tell your provider if you are allergic to any medicine. Keep a list of the medicines, vitamins, and herbs you take. Include the amounts, and when and why you take them. Bring the list or the pill bottles to follow-up visits. Carry your medicine list with you in case of an emergency.    Self-care:   Rinse your sinuses as directed.  Use a sinus rinse device to rinse your nasal passages with a saline (salt water) solution or distilled water. Do not use tap water. This will help thin the mucus in your nose and rinse away pollen and dirt. It will also help reduce swelling so you can breathe normally.    Use a humidifier  to increase air moisture in your home. This may make it easier for you to breathe and help decrease your cough.     Sleep with your head elevated.  Place an extra pillow under your head before you go to sleep to help your sinuses drain.     Drink liquids as directed.  Ask your healthcare provider how much liquid to drink each day and which liquids are best for you. Liquids will thin the mucus in your nose and help it drain. Avoid drinks that contain alcohol or caffeine.     Do not smoke, and avoid secondhand smoke.  Nicotine and other chemicals in cigarettes and cigars can make your symptoms worse. Ask your healthcare provider for information if you currently smoke and need help to quit. E-cigarettes or smokeless tobacco still contain nicotine. Talk to your healthcare provider before you use these products.    Prevent the spread of germs:   Wash your hands often with  soap and water.  Wash your hands after you use the bathroom, change a child's diaper, or sneeze. Wash your hands before you prepare or eat food.         Stay away from people who are sick.  Some germs spread easily and quickly through contact.    Follow up with your doctor as directed:  You may be referred to an ear, nose, and throat specialist. Write down your questions so you remember to ask them during your visits.   © Copyright [employer] 2023 Information is for End User's use only and may not be sold, redistributed or otherwise used for commercial purposes.  The above information is an  only. It is not intended as medical advice for individual conditions or treatments. Talk to your doctor, nurse or pharmacist before following any medical regimen to see if it is safe and effective for you.  Wheezing   WHAT YOU NEED TO KNOW:   Wheezing happens when air flows through a narrowed or blocked airway. Wheezing can happen when you breathe in, breathe out, or both. Wheezes may sound like a whistle, squeal, groan, or creak. Wheezes may also sound musical or high-pitched.  DISCHARGE INSTRUCTIONS:   Call your local emergency number (911 in the [place]) if:   You feel lightheaded, short of breath, and have chest pain.    You are dizzy, confused, or feel faint.    You have sudden trouble breathing.    Your throat feels like it is swelling or feels tight.    Return to the emergency department if:   You cough up blood.      Call your doctor if:   You have a fever.    Your wheezing does not get better or it gets worse.    You have questions or concerns about your condition or care.    Medicines:   Medicines  may help open your airways, decrease your symptoms, or treat an infection. They may be given as an inhaler, nebulizer, or pill.    Take your medicine as directed.  Contact your healthcare provider if you think your medicine is not helping or if you have side effects. Tell your provider if you are allergic to any  medicine. Keep a list of the medicines, vitamins, and herbs you take. Include the amounts, and when and why you take them. Bring the list or the pill bottles to follow-up visits. Carry your medicine list with you in case of an emergency.    Self care:   Return to your usual activity as directed.  You may need to limit certain activities. Ask your healthcare provider when it is okay to resume activity.    Take deep breaths and cough several times a day.  This will decrease your risk for a lung infection and help decrease wheezing. Take a deep breath and hold it for as long as you can. Let the air out and then cough strongly. Deep breaths help open your airway. You may be given an incentive spirometer to help you take deep breaths. Put the plastic piece in your mouth and take a slow, deep breath in, then let the air out and cough. Repeat these steps 10 times every hour.     Drink liquids as directed.  You may need to drink more liquids than usual to thin your mucus and prevent dehydration. Ask how much liquid to drink each day and which liquids are best for you.    Prevent wheezing:   Do not smoke.  Nicotine and other chemicals in cigarettes and cigars can cause lung damage. Ask your healthcare provider for information if you currently smoke and need help to quit. E-cigarettes or smokeless tobacco still contain nicotine. Talk to your healthcare provider before you use these products.    Avoid allergy triggers , such as animals, grass, pollen, or dust.    Follow up with your doctor as directed:  You may be referred to a specialist. Write down your questions so you remember to ask them during your visits.   © Copyright [employer] 2023 Information is for End User's use only and may not be sold, redistributed or otherwise used for commercial purposes.  The above information is an  only. It is not intended as medical advice for individual conditions or treatments. Talk to your doctor, nurse or pharmacist  before following any medical regimen to see if it is safe and effective for you.

## 2024-04-24 ENCOUNTER — APPOINTMENT (OUTPATIENT)
Dept: URGENT CARE | Facility: MEDICAL CENTER | Age: 44
End: 2024-04-24
Payer: OTHER MISCELLANEOUS

## 2024-04-24 PROCEDURE — 99213 OFFICE O/P EST LOW 20 MIN: CPT

## 2024-06-07 ENCOUNTER — OFFICE VISIT (OUTPATIENT)
Dept: URGENT CARE | Facility: CLINIC | Age: 44
End: 2024-06-07
Payer: COMMERCIAL

## 2024-06-07 VITALS
TEMPERATURE: 98.3 F | SYSTOLIC BLOOD PRESSURE: 156 MMHG | BODY MASS INDEX: 32.14 KG/M2 | HEIGHT: 66 IN | WEIGHT: 200 LBS | DIASTOLIC BLOOD PRESSURE: 84 MMHG | RESPIRATION RATE: 22 BRPM | OXYGEN SATURATION: 98 % | HEART RATE: 78 BPM

## 2024-06-07 DIAGNOSIS — R05.1 ACUTE COUGH: ICD-10-CM

## 2024-06-07 DIAGNOSIS — R06.2 WHEEZING: ICD-10-CM

## 2024-06-07 DIAGNOSIS — J01.00 ACUTE MAXILLARY SINUSITIS, RECURRENCE NOT SPECIFIED: Primary | ICD-10-CM

## 2024-06-07 PROCEDURE — G0382 LEV 3 HOSP TYPE B ED VISIT: HCPCS

## 2024-06-07 RX ORDER — ALBUTEROL SULFATE 90 UG/1
2 AEROSOL, METERED RESPIRATORY (INHALATION) EVERY 6 HOURS PRN
Qty: 18 G | Refills: 0 | Status: SHIPPED | OUTPATIENT
Start: 2024-06-07

## 2024-06-07 RX ORDER — PREDNISONE 10 MG/1
TABLET ORAL
Qty: 36 TABLET | Refills: 0 | Status: SHIPPED | OUTPATIENT
Start: 2024-06-07 | End: 2024-06-19

## 2024-06-07 RX ORDER — DOXYCYCLINE 100 MG/1
100 TABLET ORAL 2 TIMES DAILY
Qty: 20 TABLET | Refills: 0 | Status: SHIPPED | OUTPATIENT
Start: 2024-06-07 | End: 2024-06-17

## 2024-06-07 NOTE — PROGRESS NOTES
[facility]        NAME: Roxana Jernigan is a 44 y.o. female  : 1980    MRN: [id number]  DATE: 2024  TIME: 2:19 PM    Assessment and Plan   Acute maxillary sinusitis, recurrence not specified [J01.00]  1. Acute maxillary sinusitis, recurrence not specified  doxycycline (ADOXA) 100 MG tablet    predniSONE 10 mg tablet      2. Acute cough  predniSONE 10 mg tablet      3. Wheezing  albuterol (Ventolin HFA) 90 mcg/act inhaler        Several weeks of ongoing upper respiratory congestion, sinus pressure, cough.  Slight wheezing.  Minimally heard initially.  States that in the past she was prescribed albuterol inhaler and it was helpful.  At this time, will give trial of doxycycline, prednisone taper.  Advised to follow-up with family doctor.  Advised to the ER if any symptoms worsen.  Pulse ox 90% on room air without tachycardia.  She is in no acute distress.      Patient Instructions     Take prescribed medication as instructed.  Doxycycline may cause your skin to be more sensitive to sunlight than it is normally. Exposure to sunlight, even for short periods of time, may cause skin rash, itching, redness or other discoloration of the skin, or a severe sunburn. Practice proper precautions including avoiding excessive sunlight exposure, wear skin protection including clothing and sunscreen to avoid reaction.    Tylenol for pain or fever.  Avoid ibuprofen while taking prednisone.  Patient to stay well-hydrated.  Nasal saline rinses/Flonase/Elif pot for congestion and sinus pressure.  Make sure to brush teeth after inhaler use.  Follow up with PCP in 3-5 days.  Proceed to  ER if symptoms worsen.    If tests are performed, our office will contact you with results only if changes need to made to the care plan discussed with you at the visit. [facility]    Chief Complaint     Chief Complaint   Patient presents with    Cold Like Symptoms     Cough , sinus  pressure and congestion, shortness of breath started Tuesday          History of Present Illness       44-year-old female presents the clinic with going upper respiratory congestion, sinus pressure, deep cough.  Symptoms have worsened over the last few days.  Denies sick contacts.  Patient states she had similar symptoms back in early April of this year, did have short improvement for a few weeks.  Denies any fever, chills, earache, sore throat, chest pain, abdominal pain        Review of Systems   Review of Systems   Constitutional: Negative.    HENT:  Positive for congestion and sinus pressure. Negative for ear discharge, ear pain and sore throat.    Eyes: Negative.    Respiratory:  Positive for cough, shortness of breath and wheezing.    Cardiovascular: Negative.    Gastrointestinal: Negative.    Musculoskeletal: Negative.    Neurological: Negative.          Current Medications       Current Outpatient Medications:     albuterol (Ventolin HFA) 90 mcg/act inhaler, Inhale 2 puffs every 6 (six) hours as needed for wheezing, Disp: 18 g, Rfl: 0    Biktarvy -25 MG tablet, Take 1 tablet by mouth daily, Disp: , Rfl:     doxycycline (ADOXA) 100 MG tablet, Take 1 tablet (100 mg total) by mouth 2 (two) times a day for 10 days, Disp: 20 tablet, Rfl: 0    Multiple Vitamins-Minerals (Multi Complete) CAPS, Take 1 capsule by mouth daily, Disp: , Rfl:     predniSONE 10 mg tablet, Take 4 tablets (40 mg total) by mouth daily for 3 days, THEN 3 tablets (30 mg total) daily for 3 days, THEN 3 tablets (30 mg total) daily for 3 days, THEN 2 tablets (20 mg total) daily for 3 days., Disp: 36 tablet, Rfl: 0    predniSONE 10 mg tablet, 4 po for 2 days, then 3x2, 2x2, and 1x2 (Patient not taking: Reported on 6/7/2024), Disp: 20 tablet, Rfl: 0    Current Allergies     Allergies as of 06/07/2024 - Reviewed 06/07/2024   Allergen Reaction Noted    Penicillins Hives 04/08/2024            The following portions of the patient's history were  "reviewed and updated as appropriate: allergies, current medications, past family history, past medical history, past social history, past surgical history and problem list.     History reviewed. No pertinent past medical history.    History reviewed. No pertinent surgical history.    History reviewed. No pertinent family history.      Medications have been verified.        Objective   /84   Pulse 78   Temp 98.3 °F (36.8 °C)   Resp 22   Ht 5' 6\" (1.676 m)   Wt 90.7 kg (200 lb)   SpO2 98%   BMI 32.28 kg/m²        Physical Exam     Physical Exam  Constitutional:       General: She is not in acute distress.     Appearance: Normal appearance. She is not ill-appearing or diaphoretic.   HENT:      Head: Normocephalic and atraumatic.      Right Ear: Tympanic membrane, ear canal and external ear normal.      Left Ear: Tympanic membrane, ear canal and external ear normal.      Nose: Congestion present.      Right Sinus: Maxillary sinus tenderness present.      Left Sinus: Maxillary sinus tenderness present.      Mouth/Throat:      Mouth: Mucous membranes are moist.      Pharynx: Oropharynx is clear. No posterior oropharyngeal erythema.   Eyes:      Extraocular Movements: Extraocular movements intact.      Conjunctiva/sclera: Conjunctivae normal.      Pupils: Pupils are equal, round, and reactive to light.   Cardiovascular:      Rate and Rhythm: Normal rate and regular rhythm.      Pulses: Normal pulses.      Heart sounds: Normal heart sounds.   Pulmonary:      Effort: Pulmonary effort is normal. No respiratory distress.      Breath sounds: No stridor. Wheezing (Very minimally heard bilaterally on exam.  Improved throughout auscultation.  No signs of respiratory distress.) present. No rhonchi or rales.   Chest:      Chest wall: No tenderness.   Lymphadenopathy:      Cervical: No cervical adenopathy.   Skin:     General: Skin is warm and dry.      Capillary Refill: Capillary refill takes less than 2 seconds.      " Coloration: Skin is not pale.      Findings: No erythema or rash.   Neurological:      General: No focal deficit present.      Mental Status: She is alert and oriented to person, place, and time.   Psychiatric:         Mood and Affect: Mood normal.

## 2024-06-07 NOTE — PATIENT INSTRUCTIONS
Take prescribed medication as instructed.  Doxycycline may cause your skin to be more sensitive to sunlight than it is normally. Exposure to sunlight, even for short periods of time, may cause skin rash, itching, redness or other discoloration of the skin, or a severe sunburn. Practice proper precautions including avoiding excessive sunlight exposure, wear skin protection including clothing and sunscreen to avoid reaction.    Tylenol for pain or fever.  Avoid ibuprofen while taking prednisone.  Patient to stay well-hydrated.  Nasal saline rinses/Flonase/[place] pot for congestion and sinus pressure.  Make sure to brush teeth after inhaler use.  Follow up with PCP in 3-5 days.  Proceed to  ER if symptoms worsen.    If tests are performed, our office will contact you with results only if changes need to made to the care plan discussed with you at the visit. You can review your full results on [facility].  How to Use a Metered-Dose Inhaler   WHAT YOU NEED TO KNOW:   A metered-dose inhaler is a handheld device that gives you a dose of medicine as a mist. You breathe the medicine deep into your lungs to open your airways.        DISCHARGE INSTRUCTIONS:   Call your local emergency number (911 in the [place]), or have someone call if:   Your lips or nails turn blue or gray.      Return to the emergency department if:   You cough up blood.    The skin between your ribs or around your neck pulls in with every breath.    You feel short of breath, even after you use your inhaler.    Call your doctor if:   You feel the medicine spray on your tongue or throat, rather than going into your lungs.    You have to take more puffs from the inhaler than directed, in order to get relief.    You run out of medicine before your next refill is due.    You feel like your medicine is not making your symptoms better.    You have questions or concerns about your condition or care.    How to use a metered-dose inhaler:  Follow the  instructions that come with your inhaler. Your medicine will work best if you use the inhaler correctly. The following steps will help you use your inhaler correctly:  Remove the cap.  Check to make sure nothing is in the mouthpiece that could block the medicine from coming out.    Shake the inhaler to mix the medicine.  Hold the inhaler upright. Prime the inhaler as directed.    Breathe out fully.  Do not breathe out into the mouthpiece.    Place the mouthpiece between your lips.  Close your lips tightly around the mouthpiece to form a seal and prevent a medicine leak.    Breathe in slowly through your mouth as you press down on the canister.  Breathe in for 5 seconds.    Hold your breath for at least 5 seconds.  This helps the medicine get deep into your lungs.    Remove the mouthpiece from your mouth.  Breathe out slowly.    Repeat puffs of medicine as directed by your healthcare provider.  Wait 1 minute between puffs.    Rinse your mouth with water or saline.  Do not swallow the water or saline.    Care for your inhaler properly:  Put the cap back on the inhaler after each use to keep the mouthpiece clean. Clean the inhaler at least 1 time each week, or as directed. Read and follow the cleaning instructions that come with your inhaler.  Follow up with your doctor or specialist as directed:  Bring your inhaler to all of your visits. You may be asked to use your inhaler at these visits so your doctor or specialist can make sure you are using it correctly. Write down your questions so you remember to ask them during your visits.  © Copyright [employer] 2023 Information is for End User's use only and may not be sold, redistributed or otherwise used for commercial purposes.  The above information is an  only. It is not intended as medical advice for individual conditions or treatments. Talk to your doctor, nurse or pharmacist before following any medical regimen to see if it is safe and effective for  you.  Acute Cough   WHAT YOU NEED TO KNOW:   An acute cough can last up to 3 weeks. Common causes of an acute cough include a cold, allergies, or a lung infection.  DISCHARGE INSTRUCTIONS:   Return to the emergency department if:   You have trouble breathing or feel short of breath.    You cough up blood, or you see blood in your mucus.    You faint or feel weak or dizzy.    You have chest pain when you cough or take a deep breath.    You have new wheezing.    Contact your healthcare provider if:   You have a fever.    Your cough lasts longer than 4 weeks.    Your symptoms do not improve with treatment.    You have questions or concerns about your condition or care.    Medicines:   Medicines  may be needed to stop the cough, decrease swelling in your airways, or help open your airways. Medicine may also be given to help you cough up mucus. Ask your healthcare provider what over-the-counter medicines you can take. If you have an infection caused by bacteria, you may need antibiotics.    Take your medicine as directed.  Contact your healthcare provider if you think your medicine is not helping or if you have side effects. Tell your provider if you are allergic to any medicine. Keep a list of the medicines, vitamins, and herbs you take. Include the amounts, and when and why you take them. Bring the list or the pill bottles to follow-up visits. Carry your medicine list with you in case of an emergency.    Manage your symptoms:   Do not smoke and stay away from others who smoke.  Nicotine and other chemicals in cigarettes and cigars can cause lung damage and make your cough worse. Ask your healthcare provider for information if you currently smoke and need help to quit. E-cigarettes or smokeless tobacco still contain nicotine. Talk to your healthcare provider before you use these products.    Drink extra liquids as directed.  Liquids will help thin and loosen mucus so you can cough it up. Liquids will also help prevent  dehydration. Examples of good liquids to drink include water, fruit juice, and broth. Do not drink liquids that contain caffeine. Caffeine can increase your risk for dehydration. Ask your healthcare provider how much liquid to drink each day.    Rest as directed.  Do not do activities that make your cough worse, such as exercise.    Use a humidifier or vaporizer.  Use a cool mist humidifier or a vaporizer to increase air moisture in your home. This may make it easier for you to breathe and help decrease your cough.    Eat 2 to 5 mL of honey 2 times each day.  Honey can help thin mucus and decrease your cough.    Use cough drops or lozenges.  These can help decrease throat irritation and your cough.    Follow up with your healthcare provider as directed:  Write down your questions so you remember to ask them during your visits.  © Copyright [employer] 2023 Information is for End User's use only and may not be sold, redistributed or otherwise used for commercial purposes.  The above information is an  only. It is not intended as medical advice for individual conditions or treatments. Talk to your doctor, nurse or pharmacist before following any medical regimen to see if it is safe and effective for you.  Sinusitis   WHAT YOU NEED TO KNOW:   Sinusitis is inflammation or infection of your sinuses. Sinusitis is most often caused by a virus. Acute sinusitis may last up to 12 weeks. Chronic sinusitis lasts longer than 12 weeks. Recurrent sinusitis means you have 4 or more infections in 1 year.        DISCHARGE INSTRUCTIONS:   Return to the emergency department if:   You have trouble breathing or wheezing that is getting worse.    You have a stiff neck, a fever, or a bad headache.     You cannot open your eye.     Your eyeball bulges out or you cannot move your eye.     You are more sleepy than normal, or you notice changes in your ability to think, move, or talk.    You have swelling of your forehead or  scalp.    Call your doctor if:   You have vision changes, such as double vision.    Your eye and eyelid are red, swollen, and painful.     Your symptoms do not improve or go away after 10 days.    You have nausea and are vomiting.    Your nose is bleeding.    You have questions or concerns about your condition or care.    Medicines:  Your symptoms may go away on their own. Your healthcare provider may recommend watchful waiting for up to 10 days before starting antibiotics. You may need any of the following:  Acetaminophen  decreases pain and fever. It is available without a doctor's order. Ask how much to take and how often to take it. Follow directions. Read the labels of all other medicines you are using to see if they also contain acetaminophen, or ask your doctor or pharmacist. Acetaminophen can cause liver damage if not taken correctly.    NSAIDs , such as ibuprofen, help decrease swelling, pain, and fever. This medicine is available with or without a doctor's order. NSAIDs can cause stomach bleeding or kidney problems in certain people. If you take blood thinner medicine, always ask your healthcare provider if NSAIDs are safe for you. Always read the medicine label and follow directions.    Nasal steroid sprays  may help decrease inflammation in your nose and sinuses.    Decongestants  help reduce swelling and drain mucus in the nose and sinuses. They may help you breathe easier.     Antihistamines  help dry mucus in the nose and relieve sneezing.     Antibiotics  help treat or prevent a bacterial infection.    Take your medicine as directed.  Contact your healthcare provider if you think your medicine is not helping or if you have side effects. Tell your provider if you are allergic to any medicine. Keep a list of the medicines, vitamins, and herbs you take. Include the amounts, and when and why you take them. Bring the list or the pill bottles to follow-up visits. Carry your medicine list with you in case  of an emergency.    Self-care:   Rinse your sinuses as directed.  Use a sinus rinse device to rinse your nasal passages with a saline (salt water) solution or distilled water. Do not use tap water. This will help thin the mucus in your nose and rinse away pollen and dirt. It will also help reduce swelling so you can breathe normally.    Use a humidifier  to increase air moisture in your home. This may make it easier for you to breathe and help decrease your cough.     Sleep with your head elevated.  Place an extra pillow under your head before you go to sleep to help your sinuses drain.     Drink liquids as directed.  Ask your healthcare provider how much liquid to drink each day and which liquids are best for you. Liquids will thin the mucus in your nose and help it drain. Avoid drinks that contain alcohol or caffeine.     Do not smoke, and avoid secondhand smoke.  Nicotine and other chemicals in cigarettes and cigars can make your symptoms worse. Ask your healthcare provider for information if you currently smoke and need help to quit. E-cigarettes or smokeless tobacco still contain nicotine. Talk to your healthcare provider before you use these products.    Prevent the spread of germs:   Wash your hands often with soap and water.  Wash your hands after you use the bathroom, change a child's diaper, or sneeze. Wash your hands before you prepare or eat food.         Stay away from people who are sick.  Some germs spread easily and quickly through contact.    Follow up with your doctor as directed:  You may be referred to an ear, nose, and throat specialist. Write down your questions so you remember to ask them during your visits.   © Copyright [employer] 2023 Information is for End User's use only and may not be sold, redistributed or otherwise used for commercial purposes.  The above information is an  only. It is not intended as medical advice for individual conditions or treatments. Talk to your  doctor, nurse or pharmacist before following any medical regimen to see if it is safe and effective for you.

## 2024-07-03 ENCOUNTER — OFFICE VISIT (OUTPATIENT)
Dept: URGENT CARE | Facility: CLINIC | Age: 44
End: 2024-07-03
Payer: COMMERCIAL

## 2024-07-03 VITALS
RESPIRATION RATE: 18 BRPM | HEIGHT: 66 IN | HEART RATE: 86 BPM | BODY MASS INDEX: 32.14 KG/M2 | TEMPERATURE: 97.6 F | OXYGEN SATURATION: 98 % | SYSTOLIC BLOOD PRESSURE: 139 MMHG | WEIGHT: 200 LBS | DIASTOLIC BLOOD PRESSURE: 78 MMHG

## 2024-07-03 DIAGNOSIS — H10.31 ACUTE BACTERIAL CONJUNCTIVITIS OF RIGHT EYE: Primary | ICD-10-CM

## 2024-07-03 DIAGNOSIS — J06.9 ACUTE URI: ICD-10-CM

## 2024-07-03 PROCEDURE — G0382 LEV 3 HOSP TYPE B ED VISIT: HCPCS

## 2024-07-03 RX ORDER — OFLOXACIN 3 MG/ML
1 SOLUTION/ DROPS OPHTHALMIC 4 TIMES DAILY
Qty: 5 ML | Refills: 0 | Status: SHIPPED | OUTPATIENT
Start: 2024-07-03

## 2024-07-03 RX ORDER — AZITHROMYCIN 250 MG/1
TABLET, FILM COATED ORAL
Qty: 6 TABLET | Refills: 0 | Status: SHIPPED | OUTPATIENT
Start: 2024-07-03 | End: 2024-07-07

## 2024-07-03 NOTE — PATIENT INSTRUCTIONS
Take prescribed medications as instructed.  Avoid wearing contact lenses until symptoms improve.  Warm compresses to right eye multiple times daily.  Change pillowcases daily.  Tylenol or ibuprofen for pain or fever.  Nasal saline rinses/Flonase for congestion.  Follow-up with your family doctor/eye doctor if no improvement.  Follow up with PCP in 3-5 days.  Proceed to  ER if symptoms worsen.    If tests are performed, our office will contact you with results only if changes need to made to the care plan discussed with you at the visit. You [facility]  Patient Education     Conjunctivitis (Pink Eye) ED   General Information   You came to the Emergency Department (ED) for pink eye. The medical name for this is conjunctivitis. Your eye is irritated or infected. Sometimes an allergy is bothering your eye. This means something in the air has come into contact with your eye and it is red and irritated. Your eye may also itch, burn, or be sensitive to light. If an infection is causing your pink eye, it is easy to spread from person to person.  Infections are often caused by viruses and antibiotics won’t help. Most of the time, pink eye will go away on its own without treatment after a few days.  If the doctor thinks you have a bacterial infection in your eye, you will need antibiotics to treat the infection. It is important to take all of your antibiotics even if your eye starts to feel better.  What care is needed at home?   Call your regular doctor to let them know you were in the ED. Make a follow-up appointment if you were told to.  Gently remove your eye drainage or crusting with a clean cloth and warm water.  Avoid pollen if an allergy is causing your pink eye. Stay inside as much as you can with the windows closed during peak allergy seasons.  Lubricating eye drops or allergy eye drops may help your eye feel better. Make sure to read the directions carefully. Wash your hands  with care before and after you touch your eye.  When you use eye drops, take care not to touch the bottle or dropper to your eye.  If you wear contact lenses, you will need to stop wearing them for a short time until your symptoms go away. If your contacts are disposable, start with fresh ones after your eye gets better. If not, clean your contacts with care. Clean your contact case thoroughly or get a new one.  Avoid sharing towels, washcloths, bedding, or personal items when you have pink eye.  You may need to stay out of work or school for a few days.  When do I need to call the doctor?   You have trouble seeing clearly after blinking.  Your eye is still red or has drainage after 3 days.  You have eye pain that is getting worse.  You have new or worsening symptoms.  Last Reviewed Date   2020-12-16  Consumer Information Use and Disclaimer   This generalized information is a limited summary of diagnosis, treatment, and/or medication information. It is not meant to be comprehensive and should be used as a tool to help the user understand and/or assess potential diagnostic and treatment options. It does NOT include all information about conditions, treatments, medications, side effects, or risks that may apply to a specific patient. It is not intended to be medical advice or a substitute for the medical advice, diagnosis, or treatment of a health care provider based on the health care provider's examination and assessment of a patient’s specific and unique circumstances. Patients must speak with a health care provider for complete information about their health, medical questions, and treatment options, including any risks or benefits regarding use of medications. This information does not endorse any treatments or medications as safe, effective, or approved for treating a specific patient. Up[employer] and its affiliates disclaim any warranty or liability relating to this information or the use thereof. The use of  "this information is governed by the Terms of Use, available at https://www.wolterskluwer.com/en/know/clinical-effectiveness-terms   Copyright   Copyright © 2024 [employer] Inc[employer] and its affiliates and/or licensors. All rights reserved.  Patient Education     Upper respiratory infection in adults - Discharge instructions   The Basics   Written by the doctors and editors at [facility]   What are discharge instructions? -- Discharge instructions are information about how to take care of yourself after getting medical care for a health problem.  What is an upper respiratory infection? -- An upper respiratory infection (\"URI\") is an illness that can affect your nose, throat, ears, and sinuses. Almost all URIs are caused by a virus. The common cold is an example of a viral URI. Some URIs are caused by bacteria, but this is much less common.  URIs spread easily from person to person, most often through coughing or sneezing. A URI will almost always get better in a week or 2 without any treatment. Because most URIs are caused by viruses, antibiotics do not usually help.  If you do have a bacterial infection, your doctor might prescribe antibiotics.  How do I care for myself at home? -- Ask the doctor or nurse what you should do when you go home. Make sure that you understand exactly what you need to do to care for yourself. Ask questions if there is anything you do not understand.  You should also:   Wash your hands often (figure 1), and cough or sneeze into a tissue. If you do not have a tissue, cough or sneeze into your elbow instead of your hands.   Drink lots of fluids (water, juice, or broth) to stay hydrated, unless your doctor told you otherwise. This will help replace any fluids lost through runny nose or fever. Warm tea or soup can also help soothe a sore throat.   To help a stuffy nose and make it easier to breathe:   Use saline nose drops or spray.   Use a humidifier if the air in your home feels dry.   Follow the " directions on the label carefully if you take over-the-counter cough or cold medicines. Do not take more than 1 medicine that contains acetaminophen. Also, if you have a heart problem or high blood pressure, check with your doctor before you take any of these medicines.   Try to quit smoking if you smoke. Your doctor or nurse can help.  How can I prevent getting another URI? -- The best way to prevent a URI, or keep it from spreading to others, is to keep your hands clean. Wash your hands often with soap and water or alcohol gel rubs.  Some other ways to prevent the spread of infection include:   Always wash your hands with soap and water after you cough, sneeze, or blow your nose.   Clean surfaces and objects that you touch a lot. These include sinks, counters, tables, door handles, remotes, and phones. Use a bleach and water mixture. The germs that cause a URI can live on surfaces for at least 2 hours.   Do not share cups, food, towels, bed linens, or other personal items.   Stay away from other people when you are sick. When you do need to be around other people, consider wearing a face mask.  When should I call the doctor? -- Call for advice if:   You have a persistent fever of 100.4°F (38°C) or higher, chills, a very bad sore throat, or ear or sinus pain.   You get a new fever after several days of feeling the same or getting better.   You start having chest pain when you cough.   You have a cough that lasts more than 10 days.   You cough up blood.   You have any new or worsening symptoms, such as worsening cough or trouble breathing.  All topics are updated as new evidence becomes available and our peer review process is complete.  This topic retrieved from [employer] on: Mar 13, 2024.  Topic 374851 Version 1.0  Release: 32.2.4 - C32.71  © 2024 UpToDate, Inc. and/or its affiliates. All rights reserved.  figure 1: How to wash your hands     Wet your hands with clean water, and apply a small amount of soap. Lather  and rub hands together for at least 20 seconds. Clean your wrists, palms, backs of your hands, between your fingers, tips of your fingers, thumbs, and under and around your nails. Rinse well, and dry your hands using a clean towel.  Graphic 760029 Version 7.0  Consumer Information Use and Disclaimer   Disclaimer: This generalized information is a limited summary of diagnosis, treatment, and/or medication information. It is not meant to be comprehensive and should be used as a tool to help the user understand and/or assess potential diagnostic and treatment options. It does NOT include all information about conditions, treatments, medications, side effects, or risks that may apply to a specific patient. It is not intended to be medical advice or a substitute for the medical advice, diagnosis, or treatment of a health care provider based on the health care provider's examination and assessment of a patient's specific and unique circumstances. Patients must speak with a health care provider for complete information about their health, medical questions, and treatment options, including any risks or benefits regarding use of medications. This information does not endorse any treatments or medications as safe, effective, or approved for treating a specific patient. Up[employer], Inc[employer] and its affiliates disclaim any warranty or liability relating to this information or the use thereof.The use of this information is governed by the Terms of Use, available at https://www.woltersDropico Mediauwer.com/en/know/clinical-effectiveness-terms. 2024© UpToDate, Inc. and its affiliates and/or licensors. All rights reserved.  Copyright   © 2024 [employer], Inc. and/or its affiliates. All rights reserved.

## 2024-07-03 NOTE — PROGRESS NOTES
[facility] Now        NAME: Roxana Jernigan is a 44 y.o. female  : 1980    MRN: [id number]  DATE: July 3, 2024  TIME: 4:10 PM    Assessment and Plan   Acute bacterial conjunctivitis of right eye [H10.31]  1. Acute bacterial conjunctivitis of right eye  ofloxacin (OCUFLOX) 0.3 % ophthalmic solution      2. Acute URI  azithromycin (ZITHROMAX) 250 mg tablet        Right eye conjunctival injection and drainage that began last night.  Was having some upper respiratory congestion and sinus pressure since the end of last week.  States her eye was crusted shut.  She does wear contact lenses.  Advised to discontinue wearing contact lenses until symptoms improve.  Will send in ofloxacin ophthalmic solution as well as azithromycin for upper respiratory infection.  Advised to follow-up with family doctor.  Advised to go to the ER if any symptoms worsen.    Patient Instructions     Take prescribed medications as instructed.  Avoid wearing contact lenses until symptoms improve.  Warm compresses to right eye multiple times daily.  Change pillowcases daily.  Tylenol or ibuprofen for pain or fever.  Nasal saline rinses/Flonase for congestion.  Follow-up with your family doctor/eye doctor if no improvement.  Follow up with PCP in 3-5 days.  Proceed to  ER if symptoms worsen.    If tests are performed, our office will contact you with results only if changes need to made to the care plan discussed with you at the visit. [facility]    Chief Complaint     Chief Complaint   Patient presents with    Conjunctivitis     Right eye swelling,  eye all red. Symptoms started last night and she woke up with her eye shut.             History of Present Illness       44-year-old female presents to the clinic with 1 day of right eye redness and discolored drainage.  Denies any injury or trauma.  Does wear contact lenses.  Denies changes in vision.  Has been having upper respiratory congestion  and sinus pressure since the end of last week.  Denies any fever, chills, chest pain, shortness of breath, earache.    Conjunctivitis   Associated symptoms include congestion, eye discharge and eye redness. Pertinent negatives include no eye itching, no photophobia, no ear discharge, no ear pain, no sore throat and no eye pain.       Review of Systems   Review of Systems   Constitutional: Negative.    HENT:  Positive for congestion and sinus pressure. Negative for ear discharge, ear pain and sore throat.    Eyes:  Positive for discharge and redness. Negative for photophobia, pain, itching and visual disturbance.   Respiratory: Negative.     Cardiovascular: Negative.    Skin: Negative.    Neurological: Negative.          Current Medications       Current Outpatient Medications:     albuterol (Ventolin HFA) 90 mcg/act inhaler, Inhale 2 puffs every 6 (six) hours as needed for wheezing, Disp: 18 g, Rfl: 0    azithromycin (ZITHROMAX) 250 mg tablet, Take 2 tablets today then 1 tablet daily x 4 days, Disp: 6 tablet, Rfl: 0    Biktarvy -25 MG tablet, Take 1 tablet by mouth daily, Disp: , Rfl:     Multiple Vitamins-Minerals (Multi Complete) CAPS, Take 1 capsule by mouth daily, Disp: , Rfl:     ofloxacin (OCUFLOX) 0.3 % ophthalmic solution, Administer 1 drop to the right eye 4 (four) times a day, Disp: 5 mL, Rfl: 0    predniSONE 10 mg tablet, 4 po for 2 days, then 3x2, 2x2, and 1x2, Disp: 20 tablet, Rfl: 0    Current Allergies     Allergies as of 07/03/2024 - Reviewed 07/03/2024   Allergen Reaction Noted    Penicillins Hives 04/08/2024            The following portions of the patient's history were reviewed and updated as appropriate: allergies, current medications, past family history, past medical history, past social history, past surgical history and problem list.     History reviewed. No pertinent past medical history.    History reviewed. No pertinent surgical history.    History reviewed. No pertinent family  "history.      Medications have been verified.        Objective   /78   Pulse 86   Temp 97.6 °F (36.4 °C)   Resp 18   Ht 5' 6\" (1.676 m)   Wt 90.7 kg (200 lb)   SpO2 98%   BMI 32.28 kg/m²        Physical Exam     Physical Exam  Constitutional:       General: She is not in acute distress.     Appearance: Normal appearance. She is not ill-appearing, toxic-appearing or diaphoretic.   HENT:      Head: Normocephalic and atraumatic.      Right Ear: Tympanic membrane, ear canal and external ear normal.      Left Ear: Tympanic membrane, ear canal and external ear normal.      Nose: Congestion present.      Mouth/Throat:      Mouth: Mucous membranes are moist.      Pharynx: Oropharynx is clear. No oropharyngeal exudate or posterior oropharyngeal erythema.   Eyes:      General: Lids are normal. Vision grossly intact. No visual field deficit.        Right eye: Discharge present. No foreign body or hordeolum.         Left eye: No foreign body, discharge or hordeolum.      Extraocular Movements: Extraocular movements intact.      Right eye: Normal extraocular motion and no nystagmus.      Left eye: Normal extraocular motion and no nystagmus.      Conjunctiva/sclera:      Right eye: Right conjunctiva is injected. No chemosis, exudate or hemorrhage.     Left eye: Left conjunctiva is not injected. No chemosis, exudate or hemorrhage.     Pupils: Pupils are equal, round, and reactive to light.   Cardiovascular:      Rate and Rhythm: Normal rate and regular rhythm.      Pulses: Normal pulses.      Heart sounds: Normal heart sounds. No murmur heard.     No friction rub. No gallop.   Pulmonary:      Effort: Pulmonary effort is normal. No respiratory distress.      Breath sounds: Normal breath sounds. No stridor. No wheezing, rhonchi or rales.   Chest:      Chest wall: No tenderness.   Musculoskeletal:      Cervical back: Normal range of motion and neck supple. No tenderness.   Lymphadenopathy:      Cervical: No cervical " adenopathy.   Skin:     General: Skin is warm and dry.      Capillary Refill: Capillary refill takes less than 2 seconds.      Findings: No rash.   Neurological:      General: No focal deficit present.      Mental Status: She is alert and oriented to person, place, and time. Mental status is at baseline.   Psychiatric:         Mood and Affect: Mood normal.         Behavior: Behavior normal.         Thought Content: Thought content normal.         Judgment: Judgment normal.

## 2024-10-04 ENCOUNTER — OFFICE VISIT (OUTPATIENT)
Dept: URGENT CARE | Facility: CLINIC | Age: 44
End: 2024-10-04
Payer: COMMERCIAL

## 2024-10-04 VITALS
RESPIRATION RATE: 18 BRPM | OXYGEN SATURATION: 100 % | DIASTOLIC BLOOD PRESSURE: 82 MMHG | SYSTOLIC BLOOD PRESSURE: 138 MMHG | HEART RATE: 88 BPM | TEMPERATURE: 98 F

## 2024-10-04 DIAGNOSIS — J01.40 ACUTE PANSINUSITIS, RECURRENCE NOT SPECIFIED: Primary | ICD-10-CM

## 2024-10-04 PROCEDURE — G0382 LEV 3 HOSP TYPE B ED VISIT: HCPCS

## 2024-10-04 RX ORDER — AZITHROMYCIN 250 MG/1
TABLET, FILM COATED ORAL
Qty: 6 TABLET | Refills: 0 | Status: SHIPPED | OUTPATIENT
Start: 2024-10-04 | End: 2024-10-08

## 2024-10-04 RX ORDER — PREDNISONE 20 MG/1
40 TABLET ORAL DAILY
Qty: 10 TABLET | Refills: 0 | Status: SHIPPED | OUTPATIENT
Start: 2024-10-04 | End: 2024-10-09

## 2024-10-04 NOTE — PROGRESS NOTES
[facility]        NAME: Roxana Jernigan is a 44 y.o. female  : 1980    MRN: [id number]  DATE: 2024  TIME: 3:27 PM    Assessment and Plan   Acute pansinusitis, recurrence not specified [J01.40]  1. Acute pansinusitis, recurrence not specified  azithromycin (ZITHROMAX) 250 mg tablet    predniSONE 20 mg tablet            Patient Instructions     Take otc medicines if needed for cough and cold   You may take over the counter Tylenol (Acetaminophen) and/or Motrin (Ibuprofen) as needed, as directed on packaging.   Take medicines today as directed.  If not improving you will need to be rechecked in 3-5 days. If worsening, you will need to be checked sooner    Follow up with PCP in 3-5 days.  Proceed to  ER if symptoms worsen.    If tests are performed, our office will contact you with results only if changes need to made to the care plan discussed with you at the visit. [facility]    Chief Complaint     Chief Complaint   Patient presents with    Cold Like Symptoms     Nasal congestion  Yellow drainage  Started 2 days ago  OTC meds, helping a little    Headache    Cough         History of Present Illness       44-year-old female presents with complaint of nasal congestion, headache, cough.  She reports she has a history of sinus infections and this feels the same as prior.  She is taking over-the-counter Mucinex with minimal relief.        Review of Systems   Review of Systems   HENT:  Positive for congestion.    Respiratory:  Positive for cough.    Neurological:  Positive for headaches.         Current Medications       Current Outpatient Medications:     albuterol (Ventolin HFA) 90 mcg/act inhaler, Inhale 2 puffs every 6 (six) hours as needed for wheezing, Disp: 18 g, Rfl: 0    azithromycin (ZITHROMAX) 250 mg tablet, Take 2 tablets today then 1 tablet daily x 4 days, Disp: 6 tablet, Rfl: 0    Biktarvy -25 MG tablet, Take 1 tablet by mouth  daily, Disp: , Rfl:     Multiple Vitamins-Minerals (Multi Complete) CAPS, Take 1 capsule by mouth daily, Disp: , Rfl:     predniSONE 20 mg tablet, Take 2 tablets (40 mg total) by mouth daily for 5 days, Disp: 10 tablet, Rfl: 0    ofloxacin (OCUFLOX) 0.3 % ophthalmic solution, Administer 1 drop to the right eye 4 (four) times a day (Patient not taking: Reported on 10/4/2024), Disp: 5 mL, Rfl: 0    Current Allergies     Allergies as of 10/04/2024 - Reviewed 10/04/2024   Allergen Reaction Noted    Penicillins Hives 04/08/2024            The following portions of the patient's history were reviewed and updated as appropriate: allergies, current medications, past family history, past medical history, past social history, past surgical history and problem list.     Past Medical History:   Diagnosis Date    HIV (human immunodeficiency virus infection) ([facility])        History reviewed. No pertinent surgical history.    History reviewed. No pertinent family history.      Medications have been verified.        Objective   /82   Pulse 88   Temp 98 °F (36.7 °C)   Resp 18   SpO2 100%        Physical Exam     Physical Exam  Vitals and nursing note reviewed.   Constitutional:       Appearance: Normal appearance.   HENT:      Head: Normocephalic and atraumatic.      Right Ear: Ear canal and external ear normal. Tympanic membrane is injected.      Left Ear: Ear canal and external ear normal. Tympanic membrane is injected.      Nose: Congestion present.      Right Turbinates: Swollen.      Left Turbinates: Swollen.      Right Sinus: Maxillary sinus tenderness and frontal sinus tenderness present.      Left Sinus: Maxillary sinus tenderness and frontal sinus tenderness present.      Mouth/Throat:      Lips: Pink.      Mouth: Mucous membranes are moist.      Tongue: No lesions. Tongue does not deviate from midline.      Palate: No mass and lesions.      Pharynx: Oropharynx is clear. Uvula midline. Posterior oropharyngeal  erythema present. No pharyngeal swelling, oropharyngeal exudate or uvula swelling.      Tonsils: No tonsillar exudate or tonsillar abscesses.   Cardiovascular:      Rate and Rhythm: Normal rate and regular rhythm.      Pulses: Normal pulses.      Heart sounds: Normal heart sounds.   Pulmonary:      Effort: Pulmonary effort is normal.      Breath sounds: Normal breath sounds.      Comments: Congested cough occasionally throughout examination  Musculoskeletal:      Cervical back: Normal range of motion and neck supple.   Lymphadenopathy:      Cervical: No cervical adenopathy.   Skin:     General: Skin is warm and dry.      Capillary Refill: Capillary refill takes less than 2 seconds.   Neurological:      General: No focal deficit present.      Mental Status: She is alert and oriented to person, place, and time.

## 2024-10-04 NOTE — PATIENT INSTRUCTIONS
Take otc medicines if needed for cough and cold   You may take over the counter Tylenol (Acetaminophen) and/or Motrin (Ibuprofen) as needed, as directed on packaging.   Take medicines today as directed.  If not improving you will need to be rechecked in 3-5 days. If worsening, you will need to be checked sooner

## 2024-10-13 ENCOUNTER — APPOINTMENT (OUTPATIENT)
Dept: RADIOLOGY | Facility: CLINIC | Age: 44
End: 2024-10-13
Payer: COMMERCIAL

## 2024-10-13 ENCOUNTER — OFFICE VISIT (OUTPATIENT)
Dept: URGENT CARE | Facility: CLINIC | Age: 44
End: 2024-10-13
Payer: COMMERCIAL

## 2024-10-13 VITALS
HEART RATE: 103 BPM | TEMPERATURE: 98.3 F | OXYGEN SATURATION: 95 % | RESPIRATION RATE: 20 BRPM | DIASTOLIC BLOOD PRESSURE: 84 MMHG | SYSTOLIC BLOOD PRESSURE: 136 MMHG

## 2024-10-13 DIAGNOSIS — R05.1 ACUTE COUGH: ICD-10-CM

## 2024-10-13 DIAGNOSIS — J01.41 ACUTE RECURRENT PANSINUSITIS: Primary | ICD-10-CM

## 2024-10-13 PROCEDURE — 71046 X-RAY EXAM CHEST 2 VIEWS: CPT

## 2024-10-13 PROCEDURE — G0381 LEV 2 HOSP TYPE B ED VISIT: HCPCS

## 2024-10-13 RX ORDER — DOXYCYCLINE 100 MG/1
100 TABLET ORAL 2 TIMES DAILY
Qty: 14 TABLET | Refills: 0 | Status: SHIPPED | OUTPATIENT
Start: 2024-10-13 | End: 2024-10-20

## 2024-10-13 RX ORDER — ALBUTEROL SULFATE 90 UG/1
2 INHALANT RESPIRATORY (INHALATION) EVERY 6 HOURS PRN
Qty: 8.5 G | Refills: 0 | Status: SHIPPED | OUTPATIENT
Start: 2024-10-13

## 2024-10-13 NOTE — PATIENT INSTRUCTIONS
No pneumonia.  Please start the doxycycline. No calcium or magnesium within 2 hours of medication.  Avoid direct sunlight.  Quit smoking.  Use inhaler as directed.

## 2024-10-13 NOTE — PROGRESS NOTES
[facility]        NAME: Roxana Jernigan is a 44 y.o. female  : 1980    MRN: [id number]  DATE: 2024  TIME: 10:45 AM    Assessment and Plan   Acute recurrent pansinusitis [J01.41]  1. Acute recurrent pansinusitis  XR chest pa and lateral    doxycycline (ADOXA) 100 MG tablet    albuterol (ProAir HFA) 90 mcg/act inhaler        Cxr negative  Albuterol for wheezing, advised smoking cessation.  Will treat with doxy for sinusitis. Likely patient treated too soon with abx for viral infection.     Patient Instructions     No pneumonia.  Please start the doxycycline. No calcium or magnesium within 2 hours of medication.  Avoid direct sunlight.  Quit smoking.  Use inhaler as directed.  Follow up with PCP in 3-5 days.  Proceed to  ER if symptoms worsen.    Chief Complaint     Chief Complaint   Patient presents with    Cold Like Symptoms     Pt c/o congestion, sinus infection, was here 10 days ago, got z pack, neon green/ brown phlegm. Wheezing. Has to pop ear drums. Has been taking her albuterol inhaler         History of Present Illness       Patient is a 44 year old female who presents to the office today for continued congestion, wheezing, lightheaded, sob. Notes symptoms started 12 days ago, ws seen sere on day 2 and started on azithromycin and prednisone. Symptoms worsening. Denies fever but feels hot. Denies sick contacts at home. Works as a .         Review of Systems   Review of Systems   Constitutional:  Positive for activity change, chills and fatigue. Negative for fever.   HENT:  Positive for congestion, ear pain, rhinorrhea, sinus pressure and sinus pain.    Respiratory:  Positive for cough and wheezing.    All other systems reviewed and are negative.        Current Medications       Current Outpatient Medications:     albuterol (ProAir HFA) 90 mcg/act inhaler, Inhale 2 puffs every 6 (six) hours as needed for wheezing, Disp: 8.5 g, Rfl: 0    albuterol (Ventolin HFA) 90  mcg/act inhaler, Inhale 2 puffs every 6 (six) hours as needed for wheezing, Disp: 18 g, Rfl: 0    Biktarvy -25 MG tablet, Take 1 tablet by mouth daily, Disp: , Rfl:     doxycycline (ADOXA) 100 MG tablet, Take 1 tablet (100 mg total) by mouth 2 (two) times a day for 7 days, Disp: 14 tablet, Rfl: 0    Multiple Vitamins-Minerals (Multi Complete) CAPS, Take 1 capsule by mouth daily, Disp: , Rfl:     ofloxacin (OCUFLOX) 0.3 % ophthalmic solution, Administer 1 drop to the right eye 4 (four) times a day (Patient not taking: Reported on 10/4/2024), Disp: 5 mL, Rfl: 0    Current Allergies     Allergies as of 10/13/2024 - Reviewed 10/13/2024   Allergen Reaction Noted    Penicillins Hives 04/08/2024            The following portions of the patient's history were reviewed and updated as appropriate: allergies, current medications, past family history, past medical history, past social history, past surgical history and problem list.     Past Medical History:   Diagnosis Date    HIV (human immunodeficiency virus infection) ([facility])        History reviewed. No pertinent surgical history.    History reviewed. No pertinent family history.      Medications have been verified.        Objective   /84   Pulse 103   Temp 98.3 °F (36.8 °C) (Tympanic)   Resp 20   LMP 10/04/2024   SpO2 95%        Physical Exam     Physical Exam  Vitals and nursing note reviewed.   Constitutional:       Appearance: Normal appearance. She is normal weight.   HENT:      Right Ear: A middle ear effusion is present. Tympanic membrane is not erythematous or bulging.      Left Ear: A middle ear effusion is present. Tympanic membrane is not erythematous or bulging.      Nose: Congestion present.      Right Turbinates: Enlarged and swollen.      Left Turbinates: Enlarged and swollen.      Right Sinus: Maxillary sinus tenderness and frontal sinus tenderness present.      Left Sinus: Maxillary sinus tenderness and frontal sinus tenderness present.       Mouth/Throat:      Mouth: Mucous membranes are moist.   Cardiovascular:      Rate and Rhythm: Normal rate and regular rhythm.      Pulses: Normal pulses.      Heart sounds: Normal heart sounds.   Pulmonary:      Effort: Pulmonary effort is normal.      Breath sounds: Wheezing (scattered) and rhonchi (RLL, LLL) present.   Skin:     General: Skin is warm.      Capillary Refill: Capillary refill takes less than 2 seconds.   Neurological:      Mental Status: She is alert.

## 2024-10-28 ENCOUNTER — OFFICE VISIT (OUTPATIENT)
Dept: URGENT CARE | Facility: CLINIC | Age: 44
End: 2024-10-28
Payer: COMMERCIAL

## 2024-10-28 VITALS
WEIGHT: 195 LBS | HEIGHT: 66 IN | OXYGEN SATURATION: 100 % | BODY MASS INDEX: 31.34 KG/M2 | DIASTOLIC BLOOD PRESSURE: 91 MMHG | TEMPERATURE: 98.5 F | RESPIRATION RATE: 20 BRPM | SYSTOLIC BLOOD PRESSURE: 167 MMHG | HEART RATE: 74 BPM

## 2024-10-28 DIAGNOSIS — J01.41 ACUTE RECURRENT PANSINUSITIS: Primary | ICD-10-CM

## 2024-10-28 DIAGNOSIS — R03.0 ELEVATED BLOOD PRESSURE READING WITHOUT DIAGNOSIS OF HYPERTENSION: ICD-10-CM

## 2024-10-28 PROCEDURE — G0382 LEV 3 HOSP TYPE B ED VISIT: HCPCS

## 2024-10-28 RX ORDER — PREDNISONE 20 MG/1
40 TABLET ORAL DAILY
Qty: 10 TABLET | Refills: 0 | Status: SHIPPED | OUTPATIENT
Start: 2024-10-28 | End: 2024-10-28 | Stop reason: CLARIF

## 2024-10-28 RX ORDER — LEVOFLOXACIN 750 MG/1
750 TABLET, FILM COATED ORAL EVERY 24 HOURS
Qty: 7 TABLET | Refills: 0 | Status: SHIPPED | OUTPATIENT
Start: 2024-10-28 | End: 2024-11-04

## 2024-10-28 RX ORDER — FEXOFENADINE HCL AND PSEUDOEPHEDRINE HCL 180; 240 MG/1; MG/1
1 TABLET, EXTENDED RELEASE ORAL DAILY
COMMUNITY

## 2024-10-28 NOTE — PATIENT INSTRUCTIONS
Take the medicines ordered today for the full course. DO NOT stop taking even if feeling better.     You may take over the counter Tylenol (Acetaminophen) and/or Motrin (Ibuprofen) as needed, as directed on packaging.     The risk of tendon rupture is increased from taking the fluoroquinolone class of medicines (levaquin). If you develop any muscle pain or tightness  or notice any swelling you will need to stop taking this medication and be rechecked asap.     Follow up with pcp if not better in 3-5 days.     Continue taking your daily allergy medication as prescribed.

## 2024-10-28 NOTE — PROGRESS NOTES
[facility]        NAME: Roxana Jernigan is a 44 y.o. female  : 1980    MRN: [id number]  DATE: 2024  TIME: 11:24 AM    Assessment and Plan   Acute recurrent pansinusitis [J01.41]  1. Acute recurrent pansinusitis  levofloxacin (LEVAQUIN) 750 mg tablet    Ambulatory Referral to Otolaryngology    DISCONTINUED: predniSONE 20 mg tablet      2. Elevated blood pressure reading without diagnosis of hypertension          Discussed side effect of taking fluoroquinolone antibiotic and pt to stop taking and seek medical care if she experiences any muscle aches, tightness, swelling.  Patient verbalizes understanding and agrees with this plan.  Patient was advised to use over-the-counter Flonase as directed on product packaging and continue to take her daily allergy medication.  Referral was placed to ENT as this is the third visit this patient has had this month with recurrent sinus issues.    Pt denies headache, visual changes, dizziness, chest pain or SOB. Pt was advised if she develops any of these symptoms she is to present to a healthcare facility for a recheck. She was advised to go to the ER if this occurs for further treatment.     Patient Instructions     Take the medicines ordered today for the full course. DO NOT stop taking even if feeling better.     You may take over the counter Tylenol (Acetaminophen) and/or Motrin (Ibuprofen) as needed, as directed on packaging.     The risk of tendon rupture is increased from taking the fluoroquinolone class of medicines (levaquin). If you develop any muscle pain or tightness  or notice any swelling you will need to stop taking this medication and be rechecked asap.     Follow up with pcp if not better in 3-5 days.     Continue taking your daily allergy medication as prescribed.   Follow up with PCP in 3-5 days.  Proceed to  ER if symptoms worsen.    If tests are performed, our office will contact you with results only if changes need to made to the  care plan discussed with you at the visit. [facility]    Chief Complaint     Chief Complaint   Patient presents with    Earache     Left ear; treated for sinus infection x2 episodes this month, last being 10/13/24 and was placed on doxy. Completed the course and still feels pressure in the left ear and face         History of Present Illness       44-year-old female presents to this clinic with complaint of left-sided facial pain from the nose radiating over to the left ear.  She reports extreme pressure in the left ear.  Patient has been treated for sinus infection x 2 episodes so far this month with the last visit being 10/13/2024.  At that time she was placed on doxycycline.  She reports it helped her and the symptoms have returned within the last few days.  Patient is taking Allegra daily.  She is not using any nasal sprays but reports she has tried a saline rinse and it makes the sinus pressure and pain worse.        Review of Systems   Review of Systems   Constitutional:  Negative for activity change, appetite change, chills, fatigue and fever.   HENT:  Positive for ear pain, sinus pressure and sinus pain.         Scratchy throat         Current Medications       Current Outpatient Medications:     fexofenadine-pseudoephedrine (ALLEGRA-D 24) 180-240 MG per 24 hr tablet, Take 1 tablet by mouth daily, Disp: , Rfl:     levofloxacin (LEVAQUIN) 750 mg tablet, Take 1 tablet (750 mg total) by mouth every 24 hours for 7 days, Disp: 7 tablet, Rfl: 0    albuterol (ProAir HFA) 90 mcg/act inhaler, Inhale 2 puffs every 6 (six) hours as needed for wheezing, Disp: 8.5 g, Rfl: 0    albuterol (Ventolin HFA) 90 mcg/act inhaler, Inhale 2 puffs every 6 (six) hours as needed for wheezing, Disp: 18 g, Rfl: 0    Biktarvy -25 MG tablet, Take 1 tablet by mouth daily, Disp: , Rfl:     Multiple Vitamins-Minerals (Multi Complete) CAPS, Take 1 capsule by mouth daily, Disp: , Rfl:      "ofloxacin (OCUFLOX) 0.3 % ophthalmic solution, Administer 1 drop to the right eye 4 (four) times a day, Disp: 5 mL, Rfl: 0    Current Allergies     Allergies as of 10/28/2024 - Reviewed 10/28/2024   Allergen Reaction Noted    Penicillins Hives 04/08/2024            The following portions of the patient's history were reviewed and updated as appropriate: allergies, current medications, past family history, past medical history, past social history, past surgical history and problem list.     Past Medical History:   Diagnosis Date    HIV (human immunodeficiency virus infection) ([facility])        History reviewed. No pertinent surgical history.    History reviewed. No pertinent family history.      Medications have been verified.        Objective   /91   Pulse 74   Temp 98.5 °F (36.9 °C)   Resp 20   Ht 5' 6\" (1.676 m)   Wt 88.5 kg (195 lb)   LMP 10/25/2024 (Exact Date)   SpO2 100%   BMI 31.47 kg/m²        Physical Exam     Physical Exam  Vitals and nursing note reviewed.   Constitutional:       Appearance: Normal appearance.   HENT:      Head: Normocephalic and atraumatic.      Right Ear: Ear canal and external ear normal. Tympanic membrane is injected.      Left Ear: Ear canal and external ear normal. Tympanic membrane is erythematous and bulging.      Nose: Congestion present.      Right Turbinates: Swollen.      Left Turbinates: Swollen.      Right Sinus: No maxillary sinus tenderness or frontal sinus tenderness.      Left Sinus: Maxillary sinus tenderness and frontal sinus tenderness present.      Mouth/Throat:      Lips: Pink. No lesions.      Mouth: Mucous membranes are moist.      Tongue: No lesions. Tongue does not deviate from midline.      Palate: No mass and lesions.      Pharynx: Oropharynx is clear. Uvula midline. Posterior oropharyngeal erythema present.      Tonsils: No tonsillar exudate or tonsillar abscesses.   Eyes:      Extraocular Movements: Extraocular movements intact.      " Conjunctiva/sclera: Conjunctivae normal.      Pupils: Pupils are equal, round, and reactive to light.   Cardiovascular:      Rate and Rhythm: Normal rate and regular rhythm.      Pulses: Normal pulses.      Heart sounds: Normal heart sounds.   Pulmonary:      Effort: Pulmonary effort is normal.      Breath sounds: Normal breath sounds.   Musculoskeletal:      Cervical back: Normal range of motion and neck supple.   Lymphadenopathy:      Cervical: No cervical adenopathy.   Skin:     General: Skin is warm and dry.      Capillary Refill: Capillary refill takes less than 2 seconds.   Neurological:      General: No focal deficit present.      Mental Status: She is alert and oriented to person, place, and time.

## 2025-03-03 ENCOUNTER — OFFICE VISIT (OUTPATIENT)
Dept: URGENT CARE | Facility: CLINIC | Age: 45
End: 2025-03-03
Payer: COMMERCIAL

## 2025-03-03 VITALS
RESPIRATION RATE: 18 BRPM | BODY MASS INDEX: 32.14 KG/M2 | TEMPERATURE: 98.5 F | DIASTOLIC BLOOD PRESSURE: 85 MMHG | HEART RATE: 82 BPM | SYSTOLIC BLOOD PRESSURE: 143 MMHG | HEIGHT: 66 IN | OXYGEN SATURATION: 99 % | WEIGHT: 200 LBS

## 2025-03-03 DIAGNOSIS — J01.40 ACUTE PANSINUSITIS, RECURRENCE NOT SPECIFIED: Primary | ICD-10-CM

## 2025-03-03 PROCEDURE — G0382 LEV 3 HOSP TYPE B ED VISIT: HCPCS

## 2025-03-03 RX ORDER — DOXYCYCLINE HYCLATE 100 MG
100 TABLET ORAL 2 TIMES DAILY
Qty: 10 TABLET | Refills: 0 | Status: SHIPPED | OUTPATIENT
Start: 2025-03-03 | End: 2025-03-08

## 2025-03-03 NOTE — LETTER
March 3, 2025     Patient: Roxana Jernigan  YOB: 1980  Date of Visit: 3/3/2025      To Whom it May Concern:    Roxana Jernigan is under my professional care. Roxana was seen in my office on 3/3/2025. Roxana may return to work on 3/4/2025 .    If you have any questions or concerns, please don't hesitate to call.         Sincerely,          LAURY Price        CC: No Recipients

## 2025-03-03 NOTE — PATIENT INSTRUCTIONS
Take the abx each day as ordered and take until completion    Tylenol as needed for pain or fever     Follow up with ER if any worsening symptoms     If not improving in 3-5 days a recheck with family  is advised

## 2025-03-03 NOTE — PROGRESS NOTES
[facility]        NAME: Roxana Jernigan is a 45 y.o. female  : 1980    MRN: [id number]  DATE: March 3, 2025  TIME: 9:11 AM    Assessment and Plan   Acute pansinusitis, recurrence not specified [J01.40]  1. Acute pansinusitis, recurrence not specified  doxycycline hyclate (VIBRA-TABS) 100 mg tablet            Patient Instructions       Follow up with PCP in 3-5 days.  Proceed to  ER if symptoms worsen.    If tests are performed, our office will contact you with results only if changes need to made to the care plan discussed with you at the visit. You can review your full results on [facility].    Chief Complaint     Chief Complaint   Patient presents with    Cold Like Symptoms     Head and chest congestion for over a week  Phlegm is a brow/green color  SOB         History of Present Illness       45-year-old female with past medical history significant for HIV presents to this clinic with complaint of head and chest congestion for over 1 week.  Patient reports productive cough with brown/green phlegm.  And she feels short of breath.        Review of Systems   Review of Systems   HENT:  Positive for congestion.    Respiratory:  Positive for cough and shortness of breath.         Chest congestion         Current Medications       Current Outpatient Medications:     albuterol (ProAir HFA) 90 mcg/act inhaler, Inhale 2 puffs every 6 (six) hours as needed for wheezing, Disp: 8.5 g, Rfl: 0    Biktarvy -25 MG tablet, Take 1 tablet by mouth daily, Disp: , Rfl:     doxycycline hyclate (VIBRA-TABS) 100 mg tablet, Take 1 tablet (100 mg total) by mouth 2 (two) times a day for 5 days, Disp: 10 tablet, Rfl: 0    fexofenadine-pseudoephedrine (ALLEGRA-D 24) 180-240 MG per 24 hr tablet, Take 1 tablet by mouth daily, Disp: , Rfl:     Multiple Vitamins-Minerals (Multi Complete) CAPS, Take 1 capsule by mouth daily, Disp: , Rfl:     albuterol (Ventolin HFA) 90 mcg/act inhaler, Inhale 2 puffs every 6 (six)  ----- Message from Shweta Villeda sent at 2/11/2019  9:54 AM CST -----  Contact: Self [phone]  Patient Returning Your Phone Call     "hours as needed for wheezing (Patient not taking: Reported on 3/3/2025), Disp: 18 g, Rfl: 0    ofloxacin (OCUFLOX) 0.3 % ophthalmic solution, Administer 1 drop to the right eye 4 (four) times a day (Patient not taking: Reported on 3/3/2025), Disp: 5 mL, Rfl: 0    Current Allergies     Allergies as of 03/03/2025 - Reviewed 03/03/2025   Allergen Reaction Noted    Penicillins Hives 04/08/2024            The following portions of the patient's history were reviewed and updated as appropriate: allergies, current medications, past family history, past medical history, past social history, past surgical history and problem list.     Past Medical History:   Diagnosis Date    HIV (human immunodeficiency virus infection) (Formerly [facility])        History reviewed. No pertinent surgical history.    History reviewed. No pertinent family history.      Medications have been verified.        Objective   /85   Pulse 82   Temp 98.5 °F (36.9 °C)   Resp 18   Ht 5' 6\" (1.676 m)   Wt 90.7 kg (200 lb)   LMP 02/11/2025 (Exact Date)   SpO2 99%   BMI 32.28 kg/m²        Physical Exam     Physical Exam  Vitals and nursing note reviewed.   Constitutional:       Appearance: Normal appearance. She is obese.   HENT:      Head: Normocephalic and atraumatic.      Right Ear: Ear canal and external ear normal. Tympanic membrane is injected.      Left Ear: Ear canal and external ear normal. Tympanic membrane is injected.      Nose: Congestion and rhinorrhea present. Rhinorrhea is purulent.      Right Turbinates: Swollen.      Left Turbinates: Swollen.      Right Sinus: Maxillary sinus tenderness and frontal sinus tenderness present.      Left Sinus: Maxillary sinus tenderness and frontal sinus tenderness present.      Mouth/Throat:      Lips: Pink.      Mouth: Mucous membranes are moist.      Pharynx: Uvula midline. Pharyngeal swelling, posterior oropharyngeal erythema, uvula swelling and postnasal drip present.      Tonsils: No tonsillar exudate. "   Eyes:      Extraocular Movements: Extraocular movements intact.      Conjunctiva/sclera: Conjunctivae normal.      Pupils: Pupils are equal, round, and reactive to light.   Cardiovascular:      Rate and Rhythm: Normal rate and regular rhythm.      Pulses: Normal pulses.      Heart sounds: Normal heart sounds.   Pulmonary:      Effort: Pulmonary effort is normal.      Breath sounds: Normal breath sounds.   Abdominal:      General: Bowel sounds are normal.      Palpations: Abdomen is soft.   Musculoskeletal:         General: Normal range of motion.      Cervical back: Normal range of motion and neck supple.   Skin:     General: Skin is warm and dry.      Capillary Refill: Capillary refill takes less than 2 seconds.   Neurological:      General: No focal deficit present.      Mental Status: She is alert and oriented to person, place, and time.